# Patient Record
Sex: FEMALE | Race: WHITE | NOT HISPANIC OR LATINO
[De-identification: names, ages, dates, MRNs, and addresses within clinical notes are randomized per-mention and may not be internally consistent; named-entity substitution may affect disease eponyms.]

---

## 2017-04-18 ENCOUNTER — NON-APPOINTMENT (OUTPATIENT)
Age: 77
End: 2017-04-18

## 2017-04-18 ENCOUNTER — APPOINTMENT (OUTPATIENT)
Dept: CARDIOLOGY | Facility: CLINIC | Age: 77
End: 2017-04-18

## 2017-04-18 VITALS
DIASTOLIC BLOOD PRESSURE: 74 MMHG | SYSTOLIC BLOOD PRESSURE: 173 MMHG | HEIGHT: 62 IN | BODY MASS INDEX: 24.11 KG/M2 | HEART RATE: 60 BPM | OXYGEN SATURATION: 99 % | WEIGHT: 131 LBS

## 2017-04-18 VITALS — DIASTOLIC BLOOD PRESSURE: 60 MMHG | SYSTOLIC BLOOD PRESSURE: 122 MMHG

## 2017-04-24 LAB
ALBUMIN SERPL ELPH-MCNC: 4.3 G/DL
ALP BLD-CCNC: 87 U/L
ALT SERPL-CCNC: 18 U/L
ANION GAP SERPL CALC-SCNC: 14 MMOL/L
AST SERPL-CCNC: 18 U/L
BILIRUB SERPL-MCNC: 0.7 MG/DL
BUN SERPL-MCNC: 18 MG/DL
CALCIUM SERPL-MCNC: 10 MG/DL
CHLORIDE SERPL-SCNC: 102 MMOL/L
CHOLEST SERPL-MCNC: 168 MG/DL
CHOLEST/HDLC SERPL: 2 RATIO
CO2 SERPL-SCNC: 26 MMOL/L
CREAT SERPL-MCNC: 0.89 MG/DL
GLUCOSE SERPL-MCNC: 108 MG/DL
HDLC SERPL-MCNC: 84 MG/DL
LDLC SERPL CALC-MCNC: 74 MG/DL
POTASSIUM SERPL-SCNC: 4.2 MMOL/L
PROT SERPL-MCNC: 6.9 G/DL
SODIUM SERPL-SCNC: 142 MMOL/L
TRIGL SERPL-MCNC: 51 MG/DL

## 2017-04-27 ENCOUNTER — APPOINTMENT (OUTPATIENT)
Dept: CARDIOLOGY | Facility: CLINIC | Age: 77
End: 2017-04-27

## 2017-05-08 ENCOUNTER — APPOINTMENT (OUTPATIENT)
Dept: CARDIOLOGY | Facility: CLINIC | Age: 77
End: 2017-05-08

## 2017-05-24 ENCOUNTER — OUTPATIENT (OUTPATIENT)
Dept: OUTPATIENT SERVICES | Facility: HOSPITAL | Age: 77
LOS: 1 days | End: 2017-05-24
Payer: MEDICARE

## 2017-05-24 DIAGNOSIS — I26.99 OTHER PULMONARY EMBOLISM WITHOUT ACUTE COR PULMONALE: ICD-10-CM

## 2017-05-24 PROCEDURE — A9540: CPT

## 2017-05-24 PROCEDURE — 78582 LUNG VENTILAT&PERFUS IMAGING: CPT | Mod: 26

## 2017-05-24 PROCEDURE — 71020: CPT | Mod: 26

## 2017-05-24 PROCEDURE — 71046 X-RAY EXAM CHEST 2 VIEWS: CPT

## 2017-05-24 PROCEDURE — A9567: CPT

## 2017-05-24 PROCEDURE — 78582 LUNG VENTILAT&PERFUS IMAGING: CPT

## 2017-06-28 ENCOUNTER — APPOINTMENT (OUTPATIENT)
Dept: OBGYN | Facility: CLINIC | Age: 77
End: 2017-06-28

## 2017-06-28 VITALS
WEIGHT: 129 LBS | HEIGHT: 63 IN | SYSTOLIC BLOOD PRESSURE: 184 MMHG | DIASTOLIC BLOOD PRESSURE: 80 MMHG | BODY MASS INDEX: 22.86 KG/M2

## 2017-07-03 LAB — CYTOLOGY CVX/VAG DOC THIN PREP: NORMAL

## 2017-07-18 ENCOUNTER — LABORATORY RESULT (OUTPATIENT)
Age: 77
End: 2017-07-18

## 2017-07-18 ENCOUNTER — APPOINTMENT (OUTPATIENT)
Dept: PULMONOLOGY | Facility: CLINIC | Age: 77
End: 2017-07-18

## 2017-07-18 VITALS
RESPIRATION RATE: 14 BRPM | BODY MASS INDEX: 23.57 KG/M2 | DIASTOLIC BLOOD PRESSURE: 72 MMHG | HEART RATE: 66 BPM | WEIGHT: 133 LBS | HEIGHT: 63 IN | TEMPERATURE: 97.7 F | OXYGEN SATURATION: 96 % | SYSTOLIC BLOOD PRESSURE: 128 MMHG

## 2017-07-18 DIAGNOSIS — J18.9 PNEUMONIA, UNSPECIFIED ORGANISM: ICD-10-CM

## 2017-07-18 DIAGNOSIS — C44.90 UNSPECIFIED MALIGNANT NEOPLASM OF SKIN, UNSPECIFIED: ICD-10-CM

## 2017-07-18 DIAGNOSIS — N60.01 SOLITARY CYST OF RIGHT BREAST: ICD-10-CM

## 2017-07-18 DIAGNOSIS — Z80.9 FAMILY HISTORY OF MALIGNANT NEOPLASM, UNSPECIFIED: ICD-10-CM

## 2017-07-18 DIAGNOSIS — Z82.3 FAMILY HISTORY OF STROKE: ICD-10-CM

## 2017-07-19 LAB
25(OH)D3 SERPL-MCNC: 38 NG/ML
A1AT SERPL-MCNC: 174 MG/DL
ALBUMIN SERPL ELPH-MCNC: 4.5 G/DL
ALP BLD-CCNC: 87 U/L
ALT SERPL-CCNC: 32 U/L
ANION GAP SERPL CALC-SCNC: 21 MMOL/L
APTT BLD: 32.4 SEC
AST SERPL-CCNC: 36 U/L
B2 MICROGLOB SERPL-MCNC: 2 MG/L
BASOPHILS # BLD AUTO: 0.04 K/UL
BASOPHILS NFR BLD AUTO: 0.4 %
BILIRUB SERPL-MCNC: 1 MG/DL
BUN SERPL-MCNC: 18 MG/DL
CALCIUM SERPL-MCNC: 10.3 MG/DL
CALCIUM SERPL-MCNC: 10.3 MG/DL
CCP AB SER IA-ACNC: <8 UNITS
CHLORIDE SERPL-SCNC: 99 MMOL/L
CO2 SERPL-SCNC: 22 MMOL/L
CREAT SERPL-MCNC: 0.85 MG/DL
CRP SERPL-MCNC: 0.3 MG/DL
DEPRECATED KAPPA LC FREE/LAMBDA SER: 1.01 RATIO
ENA SCL70 IGG SER IA-ACNC: <0.2 AL
ENA SS-A AB SER IA-ACNC: <0.2 AL
ENA SS-B AB SER IA-ACNC: <0.2 AL
EOSINOPHIL # BLD AUTO: 0.61 K/UL
EOSINOPHIL NFR BLD AUTO: 5.8 %
ERYTHROCYTE [SEDIMENTATION RATE] IN BLOOD BY WESTERGREN METHOD: 17 MM/HR
FOLATE RBC-MCNC: 1934 NG/ML
GLUCOSE SERPL-MCNC: 87 MG/DL
HBA1C MFR BLD HPLC: 5.6 %
HCT VFR BLD CALC: 42 %
HCT VFR BLD CALC: 43 %
HCYS SERPL-MCNC: 10.6 UMOL/L
HGB BLD-MCNC: 13.3 G/DL
IGA SER QL IEP: 140 MG/DL
IGG SER QL IEP: 1070 MG/DL
IGM SER QL IEP: 35 MG/DL
IMM GRANULOCYTES NFR BLD AUTO: 0.1 %
INR PPP: 0.92 RATIO
KAPPA LC CSF-MCNC: 1.66 MG/DL
KAPPA LC SERPL-MCNC: 1.67 MG/DL
LYMPHOCYTES # BLD AUTO: 2.69 K/UL
LYMPHOCYTES NFR BLD AUTO: 25.6 %
MAN DIFF?: NORMAL
MCHC RBC-ENTMCNC: 28.4 PG
MCHC RBC-ENTMCNC: 30.9 GM/DL
MCV RBC AUTO: 91.9 FL
MONOCYTES # BLD AUTO: 0.66 K/UL
MONOCYTES NFR BLD AUTO: 6.3 %
MPO AB + PR3 PNL SER: NORMAL
NEUTROPHILS # BLD AUTO: 6.49 K/UL
NEUTROPHILS NFR BLD AUTO: 61.8 %
NT-PROBNP SERPL-MCNC: 321 PG/ML
PARATHYROID HORMONE INTACT: 38 PG/ML
PHOSPHATE SERPL-MCNC: 3.1 MG/DL
PLATELET # BLD AUTO: 270 K/UL
POTASSIUM SERPL-SCNC: 3.9 MMOL/L
PROT SERPL-MCNC: 7.8 G/DL
PT BLD: 10.4 SEC
RBC # BLD: 4.68 M/UL
RBC # FLD: 13.6 %
RF+CCP IGG SER-IMP: NEGATIVE
RHEUMATOID FACT SER QL: <7 IU/ML
SODIUM SERPL-SCNC: 142 MMOL/L
T3 SERPL-MCNC: 99 NG/DL
T3FREE SERPL-MCNC: 2.87 PG/ML
T3RU NFR SERPL: 1.03 INDEX
T4 FREE SERPL-MCNC: 1.2 NG/DL
TSH SERPL-ACNC: 1.75 UIU/ML
URATE SERPL-MCNC: 2.7 MG/DL
VIT B12 SERPL-MCNC: 875 PG/ML
WBC # FLD AUTO: 10.5 K/UL

## 2017-07-20 LAB
ANACR T: NEGATIVE
CARDIOLIPIN AB SER IA-ACNC: NEGATIVE
TOTAL IGE SMQN RAST: 18 KU/L

## 2017-07-21 LAB — COLLAGEN CTX SERPL-MCNC: 197 PG/ML

## 2017-07-24 LAB — T4 FREE SERPL DIALY-MCNC: 0.94 NG/DL

## 2017-08-01 ENCOUNTER — OUTPATIENT (OUTPATIENT)
Dept: OUTPATIENT SERVICES | Facility: HOSPITAL | Age: 77
LOS: 1 days | End: 2017-08-01
Payer: MEDICARE

## 2017-08-01 VITALS
HEART RATE: 56 BPM | DIASTOLIC BLOOD PRESSURE: 76 MMHG | RESPIRATION RATE: 16 BRPM | WEIGHT: 130.07 LBS | HEIGHT: 66 IN | TEMPERATURE: 98 F | OXYGEN SATURATION: 100 % | SYSTOLIC BLOOD PRESSURE: 196 MMHG

## 2017-08-01 DIAGNOSIS — I27.2 OTHER SECONDARY PULMONARY HYPERTENSION: ICD-10-CM

## 2017-08-01 DIAGNOSIS — Z90.49 ACQUIRED ABSENCE OF OTHER SPECIFIED PARTS OF DIGESTIVE TRACT: Chronic | ICD-10-CM

## 2017-08-01 DIAGNOSIS — Z90.89 ACQUIRED ABSENCE OF OTHER ORGANS: Chronic | ICD-10-CM

## 2017-08-01 DIAGNOSIS — Z98.890 OTHER SPECIFIED POSTPROCEDURAL STATES: Chronic | ICD-10-CM

## 2017-08-01 LAB
ALBUMIN SERPL ELPH-MCNC: 4.2 G/DL — SIGNIFICANT CHANGE UP (ref 3.3–5)
ALP SERPL-CCNC: 86 U/L — SIGNIFICANT CHANGE UP (ref 40–120)
ALT FLD-CCNC: 28 U/L RC — SIGNIFICANT CHANGE UP (ref 10–45)
ANION GAP SERPL CALC-SCNC: 13 MMOL/L — SIGNIFICANT CHANGE UP (ref 5–17)
AST SERPL-CCNC: 27 U/L — SIGNIFICANT CHANGE UP (ref 10–40)
BILIRUB SERPL-MCNC: 1 MG/DL — SIGNIFICANT CHANGE UP (ref 0.2–1.2)
BUN SERPL-MCNC: 19 MG/DL — SIGNIFICANT CHANGE UP (ref 7–23)
CALCIUM SERPL-MCNC: 9.9 MG/DL — SIGNIFICANT CHANGE UP (ref 8.4–10.5)
CHLORIDE SERPL-SCNC: 103 MMOL/L — SIGNIFICANT CHANGE UP (ref 96–108)
CO2 SERPL-SCNC: 25 MMOL/L — SIGNIFICANT CHANGE UP (ref 22–31)
CREAT SERPL-MCNC: 0.82 MG/DL — SIGNIFICANT CHANGE UP (ref 0.5–1.3)
GLUCOSE SERPL-MCNC: 98 MG/DL — SIGNIFICANT CHANGE UP (ref 70–99)
HCT VFR BLD CALC: 42.1 % — SIGNIFICANT CHANGE UP (ref 34.5–45)
HGB BLD-MCNC: 13.8 G/DL — SIGNIFICANT CHANGE UP (ref 11.5–15.5)
MCHC RBC-ENTMCNC: 30.9 PG — SIGNIFICANT CHANGE UP (ref 27–34)
MCHC RBC-ENTMCNC: 32.7 GM/DL — SIGNIFICANT CHANGE UP (ref 32–36)
MCV RBC AUTO: 94.3 FL — SIGNIFICANT CHANGE UP (ref 80–100)
PLATELET # BLD AUTO: 223 K/UL — SIGNIFICANT CHANGE UP (ref 150–400)
POTASSIUM SERPL-MCNC: 4.9 MMOL/L — SIGNIFICANT CHANGE UP (ref 3.5–5.3)
POTASSIUM SERPL-SCNC: 4.9 MMOL/L — SIGNIFICANT CHANGE UP (ref 3.5–5.3)
PROT SERPL-MCNC: 7.2 G/DL — SIGNIFICANT CHANGE UP (ref 6–8.3)
RBC # BLD: 4.47 M/UL — SIGNIFICANT CHANGE UP (ref 3.8–5.2)
RBC # FLD: 11.3 % — SIGNIFICANT CHANGE UP (ref 10.3–14.5)
SODIUM SERPL-SCNC: 141 MMOL/L — SIGNIFICANT CHANGE UP (ref 135–145)
WBC # BLD: 10.5 K/UL — SIGNIFICANT CHANGE UP (ref 3.8–10.5)
WBC # FLD AUTO: 10.5 K/UL — SIGNIFICANT CHANGE UP (ref 3.8–10.5)

## 2017-08-01 PROCEDURE — 80053 COMPREHEN METABOLIC PANEL: CPT

## 2017-08-01 PROCEDURE — 93005 ELECTROCARDIOGRAM TRACING: CPT

## 2017-08-01 PROCEDURE — C1894: CPT

## 2017-08-01 PROCEDURE — 93451 RIGHT HEART CATH: CPT

## 2017-08-01 PROCEDURE — 93010 ELECTROCARDIOGRAM REPORT: CPT

## 2017-08-01 PROCEDURE — 93451 RIGHT HEART CATH: CPT | Mod: 26,GC

## 2017-08-01 PROCEDURE — 99152 MOD SED SAME PHYS/QHP 5/>YRS: CPT

## 2017-08-01 PROCEDURE — 85027 COMPLETE CBC AUTOMATED: CPT

## 2017-08-01 PROCEDURE — C1769: CPT

## 2017-08-01 NOTE — H&P CARDIOLOGY - PMH
Aortic valve regurgitation    Carotid stenosis    HLD (hyperlipidemia)    HTN (hypertension)    Mitral regurgitation  Mild  Seizure disorder Aortic valve regurgitation  mild  Carotid stenosis    HLD (hyperlipidemia)    HTN (hypertension)    Mitral regurgitation  Mild  Seizure disorder  least episode 1.5 years ago

## 2017-08-01 NOTE — H&P CARDIOLOGY - HISTORY OF PRESENT ILLNESS
78 y/o Female with PMHx of HTN, HLD, Seizure disorder presents for coronary angiogram. Patient states she gets SOB with exertion, especially with one flight of steps. Denies any chest pain, palpitation dizziness/ syncope. Seen & evaluated by cardiologist, Dr. Hernandez and recommends for cardiac cath to evaluate for pul. HTN.   Echo on 5/2017: PASP 63 mmhg, mild MR, moderate to severe TR  PFT 7/18/17: Restrictive disease, decreased DLCO  VQ scan: Low probability of PE (5/2017)

## 2017-08-17 ENCOUNTER — APPOINTMENT (OUTPATIENT)
Dept: CARDIOLOGY | Facility: CLINIC | Age: 77
End: 2017-08-17

## 2017-09-26 ENCOUNTER — APPOINTMENT (OUTPATIENT)
Dept: PULMONOLOGY | Facility: CLINIC | Age: 77
End: 2017-09-26

## 2017-11-09 ENCOUNTER — APPOINTMENT (OUTPATIENT)
Dept: CARDIOLOGY | Facility: CLINIC | Age: 77
End: 2017-11-09
Payer: MEDICARE

## 2017-11-09 ENCOUNTER — NON-APPOINTMENT (OUTPATIENT)
Age: 77
End: 2017-11-09

## 2017-11-09 VITALS
DIASTOLIC BLOOD PRESSURE: 73 MMHG | HEIGHT: 63 IN | BODY MASS INDEX: 23.04 KG/M2 | HEART RATE: 52 BPM | SYSTOLIC BLOOD PRESSURE: 147 MMHG | WEIGHT: 130 LBS | OXYGEN SATURATION: 97 %

## 2017-11-09 VITALS — DIASTOLIC BLOOD PRESSURE: 60 MMHG | SYSTOLIC BLOOD PRESSURE: 130 MMHG

## 2017-11-09 DIAGNOSIS — Z01.419 ENCOUNTER FOR GYNECOLOGICAL EXAMINATION (GENERAL) (ROUTINE) W/OUT ABNORMAL FINDINGS: ICD-10-CM

## 2017-11-09 PROCEDURE — 99215 OFFICE O/P EST HI 40 MIN: CPT

## 2017-11-09 PROCEDURE — 93000 ELECTROCARDIOGRAM COMPLETE: CPT

## 2017-12-06 ENCOUNTER — NON-APPOINTMENT (OUTPATIENT)
Age: 77
End: 2017-12-06

## 2017-12-06 ENCOUNTER — APPOINTMENT (OUTPATIENT)
Dept: CARDIOLOGY | Facility: CLINIC | Age: 77
End: 2017-12-06
Payer: MEDICARE

## 2017-12-06 VITALS
DIASTOLIC BLOOD PRESSURE: 64 MMHG | BODY MASS INDEX: 23.39 KG/M2 | HEIGHT: 63 IN | HEART RATE: 52 BPM | SYSTOLIC BLOOD PRESSURE: 116 MMHG | OXYGEN SATURATION: 99 % | WEIGHT: 132 LBS

## 2017-12-06 PROCEDURE — 99215 OFFICE O/P EST HI 40 MIN: CPT

## 2017-12-06 PROCEDURE — 93000 ELECTROCARDIOGRAM COMPLETE: CPT

## 2017-12-12 ENCOUNTER — APPOINTMENT (OUTPATIENT)
Dept: CARDIOLOGY | Facility: CLINIC | Age: 77
End: 2017-12-12
Payer: MEDICARE

## 2017-12-12 PROCEDURE — 93015 CV STRESS TEST SUPVJ I&R: CPT

## 2017-12-13 ENCOUNTER — APPOINTMENT (OUTPATIENT)
Dept: CARDIOLOGY | Facility: CLINIC | Age: 77
End: 2017-12-13
Payer: MEDICARE

## 2017-12-13 PROCEDURE — 93224 XTRNL ECG REC UP TO 48 HRS: CPT

## 2018-01-08 ENCOUNTER — MEDICATION RENEWAL (OUTPATIENT)
Age: 78
End: 2018-01-08

## 2018-02-28 ENCOUNTER — RX RENEWAL (OUTPATIENT)
Age: 78
End: 2018-02-28

## 2018-05-22 ENCOUNTER — APPOINTMENT (OUTPATIENT)
Dept: CARDIOLOGY | Facility: CLINIC | Age: 78
End: 2018-05-22
Payer: MEDICARE

## 2018-05-22 ENCOUNTER — OTHER (OUTPATIENT)
Age: 78
End: 2018-05-22

## 2018-05-22 ENCOUNTER — NON-APPOINTMENT (OUTPATIENT)
Age: 78
End: 2018-05-22

## 2018-05-22 VITALS
OXYGEN SATURATION: 100 % | DIASTOLIC BLOOD PRESSURE: 73 MMHG | SYSTOLIC BLOOD PRESSURE: 160 MMHG | HEART RATE: 55 BPM | WEIGHT: 135 LBS | HEIGHT: 63 IN | BODY MASS INDEX: 23.92 KG/M2

## 2018-05-22 VITALS — DIASTOLIC BLOOD PRESSURE: 60 MMHG | SYSTOLIC BLOOD PRESSURE: 130 MMHG

## 2018-05-22 PROCEDURE — 93000 ELECTROCARDIOGRAM COMPLETE: CPT

## 2018-05-22 PROCEDURE — 99215 OFFICE O/P EST HI 40 MIN: CPT

## 2018-05-22 RX ORDER — LAMOTRIGINE 25 MG/1
25 TABLET ORAL TWICE DAILY
Refills: 0 | Status: ACTIVE | COMMUNITY

## 2018-06-07 ENCOUNTER — APPOINTMENT (OUTPATIENT)
Dept: CARDIOLOGY | Facility: CLINIC | Age: 78
End: 2018-06-07
Payer: MEDICARE

## 2018-06-07 PROCEDURE — 93306 TTE W/DOPPLER COMPLETE: CPT

## 2018-06-12 ENCOUNTER — APPOINTMENT (OUTPATIENT)
Dept: CARDIOLOGY | Facility: CLINIC | Age: 78
End: 2018-06-12
Payer: MEDICARE

## 2018-06-12 PROCEDURE — 93880 EXTRACRANIAL BILAT STUDY: CPT

## 2018-07-16 PROBLEM — G40.909 EPILEPSY, UNSPECIFIED, NOT INTRACTABLE, WITHOUT STATUS EPILEPTICUS: Chronic | Status: ACTIVE | Noted: 2017-08-01

## 2018-09-06 PROBLEM — E78.5 HYPERLIPIDEMIA, UNSPECIFIED: Chronic | Status: ACTIVE | Noted: 2017-08-01

## 2018-09-06 PROBLEM — I10 ESSENTIAL (PRIMARY) HYPERTENSION: Chronic | Status: ACTIVE | Noted: 2017-08-01

## 2018-10-15 ENCOUNTER — APPOINTMENT (OUTPATIENT)
Dept: PULMONOLOGY | Facility: CLINIC | Age: 78
End: 2018-10-15
Payer: MEDICARE

## 2018-10-15 VITALS
HEART RATE: 68 BPM | RESPIRATION RATE: 14 BRPM | BODY MASS INDEX: 22.5 KG/M2 | OXYGEN SATURATION: 99 % | HEIGHT: 63 IN | DIASTOLIC BLOOD PRESSURE: 72 MMHG | WEIGHT: 127 LBS | SYSTOLIC BLOOD PRESSURE: 135 MMHG

## 2018-10-15 DIAGNOSIS — G47.30 SLEEP APNEA, UNSPECIFIED: ICD-10-CM

## 2018-10-15 PROCEDURE — 99214 OFFICE O/P EST MOD 30 MIN: CPT | Mod: 25

## 2018-10-15 PROCEDURE — 94060 EVALUATION OF WHEEZING: CPT

## 2018-10-15 PROCEDURE — 94729 DIFFUSING CAPACITY: CPT

## 2018-10-15 PROCEDURE — 71046 X-RAY EXAM CHEST 2 VIEWS: CPT

## 2018-10-15 PROCEDURE — 94727 GAS DIL/WSHOT DETER LNG VOL: CPT

## 2018-11-12 ENCOUNTER — NON-APPOINTMENT (OUTPATIENT)
Age: 78
End: 2018-11-12

## 2018-11-12 ENCOUNTER — APPOINTMENT (OUTPATIENT)
Dept: CARDIOLOGY | Facility: CLINIC | Age: 78
End: 2018-11-12
Payer: MEDICARE

## 2018-11-12 VITALS
OXYGEN SATURATION: 99 % | DIASTOLIC BLOOD PRESSURE: 74 MMHG | BODY MASS INDEX: 23.04 KG/M2 | WEIGHT: 130 LBS | SYSTOLIC BLOOD PRESSURE: 158 MMHG | HEART RATE: 50 BPM | HEIGHT: 63 IN

## 2018-11-12 VITALS — SYSTOLIC BLOOD PRESSURE: 130 MMHG | DIASTOLIC BLOOD PRESSURE: 60 MMHG

## 2018-11-12 PROCEDURE — 99215 OFFICE O/P EST HI 40 MIN: CPT

## 2018-11-12 PROCEDURE — 93000 ELECTROCARDIOGRAM COMPLETE: CPT

## 2018-11-12 NOTE — REASON FOR VISIT
[Atrial Fibrillation] : atrial fibrillation [Hyperlipidemia] : hyperlipidemia [Hypertension] : hypertension [FreeTextEntry1] : aortic regurgitation

## 2018-11-12 NOTE — HISTORY OF PRESENT ILLNESS
[FreeTextEntry1] : Mrs. Kaylyn Schultz presented to the office today for cardiac evaluation.\par \par The patient is a 78-year-old female with a history of paroxysmal atrial  fibrillation, hypertension, a dyslipidemia, mild to moderate aortic regurgitation, mild to moderate carotid atherosclerosis, supraventricular ectopy (including non-sustained supraventricular tachycardia exhibited on Holter monitoring), asthma, mild obstructive sleep apnea, moderate pulmonary hypertension, osteoporosis, and transient amnesia, suspected as being related to seizure activity.\par \par The patient has been doing well from a cardiac symptomatic standpoint since her previous visit here on 5/22/18. Specifically, she has not experienced chest discomfort or dyspnea on exertion in association with her activities, which include walking 1 mile each morning. She has not noted orthopnea, paroxysmal nocturnal dyspnea, or lower extremity edema. She has not experienced any episodes of palpitations or recognized recurrence of paroxysmal atrial fibrillation. 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 She experienced an episode of gross hematuria in October of 2018, for which she followed up with a urologist, including having had a CT scan of the abdomen/pelvis performed, which she describes as having been unrevealing.\par \par The patient followed up with her pulmonologist, Dr. Carter, on 10/15/18, regarding asthma, obstructive sleep apnea, and pulmonary hypertension.\par \par Exercise stress testing performed on 12/12/17 revealed the patient to exhibit reduced exercise tolerance, but not the inducement of cardiac symptoms, electrocardiogram evidence of myocardial ischemia, or significant arrhythmias at 65% of the age-predicted maximum heart rate.\par \par Echocardiography most recently performed on 6/7/18 revealed moderate left atrial enlargement and mild right atrial enlargement. The ventricles were normal in dimension. Left ventricular wall thickness and wall motion were normal, with a calculated ejection fraction of 61%. Moderate left ventricular diastolic dysfunction is demonstrated. Mild mitral regurgitation, mild aortic regurgitation, and moderate to severe tricuspid regurgitation were demonstrated, with an estimated right ventricular systolic pressure of 52 mmHg.\par \par Carotid artery Doppler testing most recently performed on 6/12/18 revealed mild plaque formation involving the common carotid arteries and the bulbar regions bilaterally. Mild to moderate plaque formation was noted involving the proximal portion of the left internal carotid artery. Moderate to severe plaque formation was noted involving the proximal portion of the right internal carotid artery, with an estimated stenosis in the range of 50-79% involving this region.\par \par Holter monitoring performed from 12/13/17 through 12/14/17 revealed the predominant rhythm throughout the recording to be sinus rhythm at an average rate of 61 beats per minute, with a range of 46-81 beats per minute. Occasional atrial premature contractions were exhibited, including 10 couplets and 15 brief runs of an irregular supraventricular tachycardia, likely representing brief paroxysmal atrial fibrillation (longest episode 6 beats in duration at a rate of 140 beats per minute). Rare ventricular premature contractions were exhibited. No episodes of ventricular tachycardia were exhibited. No significant pauses were exhibited. The patient reported having experienced shortness of breath while watching TV on one occasion, at which time sinus rhythm at a rate of 60 beats per minute was exhibited.\par \par Laboratory studies performed on 5/30/ET revealed cholesterol 162, triglycerides 47, HDL 77, and calculated LDL 72. In the liver chemistries were normal. The glucose level was 87. The BUN and creatinine were 21 and 0.76, respectively. The potassium level was 4.7. The hemoglobin and hematocrit were 13.1 and 40.5, respectively.\par \par Previous History:\par \par The patient presented to her internist's office for a routine follow-up medical evaluation on 12/4/17, at which time she was evaluated by Dr. Chary aDng. The patient was noted to be exhibiting newly-discovered atrial fibrillation with an average ventricular rate of 120 beats per minute on her electrocardiogram. The patient was asymptomatic of the atrial fibrillation, and hence, the duration of this arrhythmia was uncertain. Dr. Dang telephoned me while the patient was in her office that day, to inform me that the patient was exhibiting newly-discovered atrial fibrillation with a moderately tachycardic ventricular response. We discussed initiating beta-blocker therapy (Toprol-XL 25 mg daily) for control of the ventricular response, as well as anticoagulation (Eliquis 5 mg b.i.d.) to decrease the risk of stroke associated with atrial fibrillation. The patient was exhibiting sinus bradycardia at the time of a follow-up visit with me on 12/6/17.\par \par I had referred the patient for follow-up echocardiography to reassess the degree of aortic regurgitation, with this study having been performed on 5/8/17. Moderate to severe tricuspid regurgitation was demonstrated, with an estimated right ventricular systolic pressure of 63 mm mercury. I then referred the patient for coronary consultation regarding the elevated pulmonary artery systolic pressure. Her evaluation included a pulmonary ventilation perfusion scan, we'll which was performed on 5/24/17, and was interpreted as being low probability for pulmonary embolus. The patient underwent a sleep study, which was suggestive of mild obstructive sleep apnea. The patient was evaluated by a pulmonary hypertension specialist, was referred for right heart catheterization, which was performed on 8/1/17. Interestingly, this study revealed a normal pulmonary artery pressure and a normal pulmonary capillary wedge pressure.\par \par Echocardiography performed on 5/8/17 revealed mild bi-atrial enlargement. The ventricles were normal in dimension. Left ventricular wall thickness and wall motion were normal, with a calculated ejection fraction of 65%. Mild aortic regurgitation, mild mitral regurgitation, and moderate to severe tricuspid regurgitation were demonstrated, with an estimated right ventricular systolic pressure of 63 mm mercury.\par \[facility] Carotid artery Doppler testing performed on 4/27/17 revealed moderate to severe plaque formation involving the internal carotid arteries bilaterally, with an estimated stenosis in the range of 50-79% involving the proximal portions of the internal carotid arteries bilaterally.\par \elda I initially evaluated the patient on 11/25/15, regarding transient episodes of amnesia, which were suspicious for seizures. Following that visit here, the patient experienced 2 recurrent transient episodes of amnesia. She had a second neurological opinion with Dr. 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 The patient was subsequently started on Lamotrigine.\par \par Exercise stress testing performed on  12/15/15 revealed the patient to exhibit reduced exercise tolerance, without the inducement of cardiac symptoms. The study was negative for electrocardiographic evidence of myocardial ischemia. Occasional supraventricular premature contractions were noted during exercise and recovery period.\par \par Echocardiography performed on 12/9/15 revealed the left atrium  to be mildly enlarged. The remainder of the cardiac chambers were normal size. Left ventricular wall thickness and wall motion were normal, with a calculated ejection fraction of 60%. There was no evidence of left ventricular diastolic dysfunction. Mild to moderate aortic insufficiency, mitral regurgitation, and moderate tricuspid regurgitation were demonstrated, with an estimated right ventricular systolic pressure of 46 mm mercury.\par \par Carotid artery Doppler testing performed on 12/8/15 revealed mild to moderate plaque formation bilaterally involving the bulbar regions, extending into the proximal portions of the internal carotid arteries bilaterally. No significant stenoses were demonstrated.\par \par Holter monitoring performed from 12/1/15 through 12/2/15 revealed the predominant rhythm to be sinus at an average rate of 64 beats per minute, with a range of  beats per minute. Relatively frequent supraventricular premature contractions were exhibited. Multiple brief episodes of supraventricular tachycardia were exhibited, as well as a 52 beat run of a supraventricular tachycardia at a rate of 101 beats per minute. Rare ventricular premature contractions were exhibited. No episodes of ventricular tachycardia were exhibited. No significant pauses or conduction abnormalities were exhibited.\par \par At the time of her initial visit here on 11/25/15, the patient informed me that over the preceding few months, she had experienced 4 randomly-occurring episodes of transient memory impairment. She was evaluated by a neurologist, who referred her for an MRI scan of the brain, an MRA scan of the carotid arteries and cerebral circulation, and an EEG. The MRI scan of the brain, performed on 9/28/15, reportedly revealed mild small vessel ischemic disease. The MRA of the carotid arteries, performed on 9/28/15, reportedly revealed a 50-60% stenosis involving the proximal portion of the right internal carotid artery. The EEG, performed on 10/8/15, was reportedly normal.\par \par Over the past few years, the patient has experienced infrequent randomly-occurring brief palpitations, described as a sensation of "a fast heartbeat" which last for just a few seconds, without any associated symptoms. She has not experienced any sustained episodes of palpitations. She has not experienced any episodes of presyncope or syncope.\par \par As far as risk factors for coronary artery disease are concerned, the patient has a history of hypertension and a dyslipidemia. She does have a history of diabetes. She denies a history of cigarette smoking. She does have an immediate family history of premature coronary artery disease in her son, who is also under my care.\par \par Past medical/surgical history according to the patient is significant for a laparoscopic cholecystectomy, right inguinal hernia repair, tonsillectomy, and 3 full-term uncomplicated pregnancies, delivered vaginally.

## 2018-11-12 NOTE — DISCUSSION/SUMMARY
[FreeTextEntry1] : The patient was noted to be exhibiting newly-discovered asymptomatic atrial fibrillation with a moderately tachycardic ventricular response (120 beats per minute) on an electrocardiogram performed at the time of a routine follow-up medical evaluation on 12/4/17. Beta-blocker therapy (Toprol-XL 25 mg daily) was initiated at that time for control of the ventricular response, and anticoagulation (Eliquis 5 mg b.i.d.) was initiated at that time to decrease the risk of stroke associated with atrial fibrillation. She was noted to be exhibiting sinus bradycardia at the time for a follow-up visit with me on 12/6/17, and also was noted to be exhibiting predominantly sinus rhythm on a Holter monitor study performed on 12/13/17 (infrequent transient episodes of paroxysmal atrial fibrillation were exhibited, the longest of which was 6 beats in duration at a rate of 140 beats per minute). The patient has been stable from a cardiac symptomatic standpoint since her previous visit here on 5/22/18. Specifically, she does not describe having experienced any signs or symptoms to suggest the development of an anginal syndrome, congestive heart failure, or a hemodynamically-compromising arrhythmia. She is exhibiting sinus bradycardia with non-specific low voltage associated with poor R-wave progression in leads V5-V6 and non-specific repolarization changes on her electrocardiogram today, essentially unchanged from her previous office tracing, allowing for lead placement variation. Her cardiac examination today is remarkable for a bradycardic rate and a faint systolic ejection murmur, essentially unchanged from her previous visit with me. Her blood pressure reading was elevated upon initial presentation to the office today, however, a follow-up measurement obtained after her examination revealed the reading to be normal.\par \par I have reviewed the findings of the Holter monitor study of 12/13/17, the exercise stress test on 12/12/17, the echocardiogram of 6/7/18, and the carotid artery Doppler study of 612/18 in detail with the patient today.\par \par I have explained the implications of paroxysmal atrial fibrillation in detail to the patient and her son today, including management strategies.  Although the options of antiarrhythmic therapy and/or an ablation procedure were discussed, as the patient was asymptomatic of the paroxysmal atrial fibrillation when she was exhibiting this arrhythmia on 12/4/17, I have not recommended that either of these options be pursued, unless the ventricular response cannot be adequately controlled with medical therapy. I have recommended to the patient that she continue Toprol-XL at a dosage of 25 mg daily for the time being.  In addition, I have recommended that anticoagulation be continued to reduce the risk of stroke associated with paroxysmal atrial fibrillation, noting that it is highly likely that the patient will exhibit recurrence of this arrhythmia.\par \par As her blood pressure reading today is normal, I have instructed the patient to continue her antihypertensive therapy as presently prescribed for the time being.\par \par I have reviewed the findings of the blood test report a 5/30/18 in detail with the patient today, and I have instructed her to continue Lipitor at a dosage of 20 mg daily for the time being. I have asked the patient to have a copy of her most recent fasting lipid profile and chemistry screen results forwarded to my office for my review and records.\par \par I have again explained to the patient that although her echocardiogram suggested the presence of significant pulmonary hypertension, the right heart catheterization of 8/1/17 revealed a normal pulmonary artery systolic pressure and a normal pulmonary capillary wedge pressure.\par \par The patient will be following up with the neurologist regarding management of her seizure disorder.\par \par I have asked the patient to call me if she should have any questions or problems pertaining to these matters, and especially if she should experience any concerning symptoms. I have otherwise asked her to return to the office in 6 months, provided she remains clinically stable in the interim.  Arrangements will be made for follow-up echocardiography and carotid artery Doppler testing to be performed in June of 2019.

## 2018-11-12 NOTE — PHYSICAL EXAM
[General Appearance - Well Developed] : well developed [General Appearance - In No Acute Distress] : no acute distress [Normal Conjunctiva] : the conjunctiva exhibited no abnormalities [No Oral Pallor] : no oral pallor [Respiration, Rhythm And Depth] : normal respiratory rhythm and effort [Auscultation Breath Sounds / Voice Sounds] : lungs were clear to auscultation bilaterally [Bowel Sounds] : normal bowel sounds [Abdomen Tenderness] : non-tender [Abnormal Walk] : normal gait [Cyanosis, Localized] : no localized cyanosis [] : no rash [Oriented To Time, Place, And Person] : oriented to person, place, and time [Not Palpable] : not palpable [No Precordial Heave] : no precordial heave was noted [Bradycardia] : bradycardic [Premature Beats] : regular with premature beats [Normal S1] : normal S1 [Normal S2] : normal S2 [No Gallop] : no gallop heard [I] : a grade 1 [2+] : left 2+ [No Abnormalities] : the abdominal aorta was not enlarged and no bruit was heard [No Pitting Edema] : no pitting edema present [FreeTextEntry1] : no JVD is appreciated at a 45° angle [Apical Thrill] : no thrill palpable at the apex [Click] : no click [Pericardial Rub] : no pericardial rub [Right Carotid Bruit] : no bruit heard over the right carotid [Left Carotid Bruit] : no bruit heard over the left carotid

## 2019-04-05 ENCOUNTER — RX RENEWAL (OUTPATIENT)
Age: 79
End: 2019-04-05

## 2019-04-15 ENCOUNTER — APPOINTMENT (OUTPATIENT)
Dept: PULMONOLOGY | Facility: CLINIC | Age: 79
End: 2019-04-15

## 2019-04-29 ENCOUNTER — NON-APPOINTMENT (OUTPATIENT)
Age: 79
End: 2019-04-29

## 2019-04-29 ENCOUNTER — APPOINTMENT (OUTPATIENT)
Dept: CARDIOLOGY | Facility: CLINIC | Age: 79
End: 2019-04-29
Payer: MEDICARE

## 2019-04-29 VITALS
HEART RATE: 55 BPM | BODY MASS INDEX: 22.32 KG/M2 | WEIGHT: 126 LBS | DIASTOLIC BLOOD PRESSURE: 73 MMHG | SYSTOLIC BLOOD PRESSURE: 179 MMHG | OXYGEN SATURATION: 97 % | HEIGHT: 63 IN

## 2019-04-29 DIAGNOSIS — R00.2 PALPITATIONS: ICD-10-CM

## 2019-04-29 PROCEDURE — 99215 OFFICE O/P EST HI 40 MIN: CPT

## 2019-04-29 PROCEDURE — 93000 ELECTROCARDIOGRAM COMPLETE: CPT

## 2019-04-29 NOTE — DISCUSSION/SUMMARY
[FreeTextEntry1] : The patient was noted to be exhibiting newly-discovered asymptomatic atrial fibrillation with a moderately tachycardic ventricular response (120 beats per minute) on an electrocardiogram performed at the time of a routine follow-up medical evaluation on 12/4/17. Beta-blocker therapy (Toprol-XL 25 mg daily) was initiated at that time for control of the ventricular response, and anticoagulation (Eliquis 5 mg b.i.d.) was initiated at that time to decrease the risk of stroke associated with atrial fibrillation. She was noted to be exhibiting sinus bradycardia at the time for a follow-up visit with me on 12/6/17, and also was noted to be exhibiting predominantly sinus rhythm on a Holter monitor study performed on 12/13/17 (infrequent transient episodes of paroxysmal atrial fibrillation were exhibited, the longest of which was 6 beats in duration at a rate of 140 beats per minute). The patient has been stable from a cardiac symptomatic standpoint since her previous visit here on 11/12/18. Specifically, she does not describe having experienced any signs or symptoms to suggest the development of an anginal syndrome, congestive heart failure, or a hemodynamically-compromising arrhythmia. She is exhibiting sinus bradycardia with non-specific low voltage associated with poor R-wave progression in leads V5-V6 and non-specific repolarization changes on her electrocardiogram today, essentially unchanged from her previous office tracing, allowing for lead placement variation. Her cardiac examination today is remarkable for a bradycardic rate and a soft systolic ejection murmur, essentially unchanged from her previous visit with me. Her blood pressure reading is moderately elevated today.\par \par I have instructed the patient increase the dosage of Norvasc from 5 mg daily to 5 mg twice daily, in an effort to more optimally control her blood pressure. I have asked her to call me if she should have any difficulty tolerating the increased dosage of Norvasc.\par \par I have reviewed the findings of the Holter monitor study of 12/13/17, the exercise stress test on 12/12/17, the echocardiogram of 6/7/18, and the carotid artery Doppler study of 612/18 in detail with the patient today.\par \par I have explained the implications of paroxysmal atrial fibrillation in detail to the patient and her son today, including management strategies.  Although the options of antiarrhythmic therapy and/or an ablation procedure were discussed, as the patient was asymptomatic of the paroxysmal atrial fibrillation when she was exhibiting this arrhythmia on 12/4/17, I have not recommended that either of these options be pursued, unless the ventricular response cannot be adequately controlled with medical therapy. I have recommended to the patient that she continue Toprol-XL at a dosage of 25 mg daily for the time being.  In addition, I have recommended that anticoagulation be continued to reduce the risk of stroke associated with paroxysmal atrial fibrillation, noting that it is highly likely that the patient will exhibit recurrence of this arrhythmia.\par \par I have reviewed the findings of the blood test report a 5/30/18 in detail with the patient today, and I have instructed her to continue Lipitor at a dosage of 20 mg daily for the time being. I have asked the patient to have a copy of her most recent fasting lipid profile and chemistry screen results forwarded to my office for my review and records.\par \par I have again explained to the patient that although her echocardiogram suggested the presence of significant pulmonary hypertension, the right heart catheterization of 8/1/17 revealed a normal pulmonary artery systolic pressure and a normal pulmonary capillary wedge pressure.\par \par The patient will be following up with the neurologist regarding management of her seizure disorder.\par \par I have asked the patient to call me if she should have any questions or problems pertaining to these matters, and especially if she should experience any concerning symptoms. I have otherwise asked her to return to the office in one month for follow-up cardiac evaluation and blood pressure reassessment (on the increased dosage of Norvasc), provided she remains clinically stable in the interim.  Arrangements will be made at that time for follow-up blood testing, echocardiography, and carotid artery Doppler testing.

## 2019-04-29 NOTE — HISTORY OF PRESENT ILLNESS
[FreeTextEntry1] : Mrs. Kaylyn Schultz presented to the office today for cardiac evaluation.\par \par The patient is a 79-year-old female with a history of paroxysmal atrial  fibrillation, hypertension, a dyslipidemia, mild to moderate aortic regurgitation, mild to moderate carotid atherosclerosis, supraventricular ectopy (including non-sustained supraventricular tachycardia exhibited on Holter monitoring), asthma, mild obstructive sleep apnea, moderate pulmonary hypertension, osteoporosis, and transient amnesia, suspected as being related to seizure activity.\par \par The patient has been stable from a cardiac symptomatic standpoint since her previous visit here on 11/12/18. Specifically, she has not experienced chest discomfort or dyspnea on exertion in association with her activities, which include walking 1 mile each morning. She has not noted orthopnea, paroxysmal nocturnal dyspnea, or lower extremity edema. She has recently experienced a pounding sensation in her left upper chest briefly while lying on her left side in bed at night (she has noted a sensation of tachycardia at these times), resolving promptly with rolling over to her right side. She has not experienced recognized recurrence of paroxysmal atrial fibrillation. 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 She experienced an episode of gross hematuria in October of 2018, for which she followed up with a urologist, including having had a CT scan of the abdomen/pelvis performed, which she describes as having been unrevealing. She reports having experienced recurrence of gross hematuria on one occasion in January of 2019, without recurrence since that time.\par \par The patient followed up with her pulmonologist, Dr. Carter, on 10/15/18, regarding asthma, obstructive sleep apnea, and pulmonary hypertension.\par \par Exercise stress testing performed on 12/12/17 revealed the patient to exhibit reduced exercise tolerance, without the inducement of cardiac symptoms, electrocardiographic evidence of myocardial ischemia, or significant arrhythmias at 65% of the age-predicted maximum heart rate.\par \par Echocardiography most recently performed on 6/7/18 revealed moderate left atrial enlargement and mild right atrial enlargement. The ventricles were normal in dimension. Left ventricular wall thickness and wall motion were normal, with a calculated ejection fraction of 61%. Moderate left ventricular diastolic dysfunction was demonstrated. Mild mitral regurgitation, mild aortic regurgitation, and moderate to severe tricuspid regurgitation were demonstrated, with an estimated right ventricular systolic pressure of 52 mmHg.\par \par Carotid artery Doppler testing most recently performed on 6/12/18 revealed mild plaque formation involving the common carotid arteries and the bulbar regions bilaterally. Mild to moderate plaque formation was noted involving the proximal portion of the left internal carotid artery. Moderate to severe plaque formation was noted involving the proximal portion of the right internal carotid artery, with an estimated stenosis in the range of 50-79% involving this region.\par \par Holter monitoring performed from 12/13/17 through 12/14/17 revealed the predominant rhythm throughout the recording to be sinus rhythm at an average rate of 61 beats per minute, with a range of 46-81 beats per minute. Occasional atrial premature contractions were exhibited, including 10 couplets and 15 brief runs of an irregular supraventricular tachycardia, likely representing brief paroxysmal atrial fibrillation (longest episode 6 beats in duration at a rate of 140 beats per minute). Rare ventricular premature contractions were exhibited. No episodes of ventricular tachycardia were exhibited. No significant pauses were exhibited. The patient reported having experienced shortness of breath while watching TV on one occasion, at which time sinus rhythm at a rate of 60 beats per minute was exhibited.\par \par Laboratory studies performed on 5/30/18 revealed cholesterol 162, triglycerides 47, HDL 77, and calculated LDL 72. In the liver chemistries were normal. The glucose level was 87. The BUN and creatinine were 21 and 0.76, respectively. The potassium level was 4.7. The hemoglobin and hematocrit were 13.1 and 40.5, respectively.\par \par Previous History:\par \par I initially evaluated the patient on 11/25/15, regarding transient episodes of amnesia, which were suspicious for seizures. Following that visit here, the patient experienced 2 recurrent transient episodes of amnesia. She had a second neurological opinion with Dr. 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 The patient was subsequently started on Lamotrigine.\par \par At the time of her initial visit here on 11/25/15, the patient informed me that over the preceding few months, she had experienced 4 randomly-occurring episodes of transient memory impairment. She was evaluated by a neurologist, who referred her for an MRI scan of the brain, an MRA scan of the carotid arteries and cerebral circulation, and an EEG. The MRI scan of the brain, performed on 9/28/15, reportedly revealed mild small vessel ischemic disease. The MRA of the carotid arteries, performed on 9/28/15, reportedly revealed a 50-60% stenosis involving the proximal portion of the right internal carotid artery. The EEG, performed on 10/8/15, was reportedly normal.\par \par The patient presented to her internist's office for a routine follow-up medical evaluation on 12/4/17, at which time she was evaluated by Dr. Chary Dang. The patient was noted to be exhibiting newly-discovered atrial fibrillation with an average ventricular rate of 120 beats per minute on her electrocardiogram. The patient was asymptomatic of the atrial fibrillation, and hence, the duration of this arrhythmia was uncertain. Dr. Dang telephoned me while the patient was in her office that day, to inform me that the patient was exhibiting newly-discovered atrial fibrillation with a moderately tachycardic ventricular response. We discussed initiating beta-blocker therapy (Toprol-XL 25 mg daily) for control of the ventricular response, as well as anticoagulation (Eliquis 5 mg b.i.d.) to decrease the risk of stroke associated with atrial fibrillation. The patient was exhibiting sinus bradycardia at the time of a follow-up visit with me on 12/6/17.\par \par I had referred the patient for follow-up echocardiography to reassess the degree of aortic regurgitation, with this study having been performed on 5/8/17. Moderate to severe tricuspid regurgitation was demonstrated, with an estimated right ventricular systolic pressure of 63 mm mercury. I then referred the patient for coronary consultation regarding the elevated pulmonary artery systolic pressure. Her evaluation included a pulmonary ventilation perfusion scan, we'll which was performed on 5/24/17, and was interpreted as being low probability for pulmonary embolus. The patient underwent a sleep study, which was suggestive of mild obstructive sleep apnea. The patient was evaluated by a pulmonary hypertension specialist, was referred for right heart catheterization, which was performed on 8/1/17. Interestingly, this study revealed a normal pulmonary artery pressure and a normal pulmonary capillary wedge pressure.\par \par As far as risk factors for coronary artery disease are concerned, the patient has a history of hypertension and a dyslipidemia. She does have a history of diabetes. She denies a history of cigarette smoking. She does have an immediate family history of premature coronary artery disease in her son, who is also under my care.\par \par Past medical/surgical history according to the patient is significant for a laparoscopic cholecystectomy, right inguinal hernia repair, tonsillectomy, and 3 full-term uncomplicated pregnancies, delivered vaginally.

## 2019-05-19 ENCOUNTER — EMERGENCY (EMERGENCY)
Facility: HOSPITAL | Age: 79
LOS: 1 days | Discharge: HOME | End: 2019-05-19
Attending: EMERGENCY MEDICINE | Admitting: EMERGENCY MEDICINE
Payer: MEDICARE

## 2019-05-19 VITALS
WEIGHT: 121.92 LBS | RESPIRATION RATE: 14 BRPM | SYSTOLIC BLOOD PRESSURE: 160 MMHG | DIASTOLIC BLOOD PRESSURE: 72 MMHG | OXYGEN SATURATION: 100 % | TEMPERATURE: 98 F | HEART RATE: 80 BPM

## 2019-05-19 DIAGNOSIS — Z98.890 OTHER SPECIFIED POSTPROCEDURAL STATES: Chronic | ICD-10-CM

## 2019-05-19 DIAGNOSIS — Z90.49 ACQUIRED ABSENCE OF OTHER SPECIFIED PARTS OF DIGESTIVE TRACT: Chronic | ICD-10-CM

## 2019-05-19 DIAGNOSIS — Z90.89 ACQUIRED ABSENCE OF OTHER ORGANS: Chronic | ICD-10-CM

## 2019-05-19 PROCEDURE — 70486 CT MAXILLOFACIAL W/O DYE: CPT | Mod: 26

## 2019-05-19 PROCEDURE — 72125 CT NECK SPINE W/O DYE: CPT

## 2019-05-19 PROCEDURE — 73562 X-RAY EXAM OF KNEE 3: CPT

## 2019-05-19 PROCEDURE — 72125 CT NECK SPINE W/O DYE: CPT | Mod: 26

## 2019-05-19 PROCEDURE — 70450 CT HEAD/BRAIN W/O DYE: CPT

## 2019-05-19 PROCEDURE — 70486 CT MAXILLOFACIAL W/O DYE: CPT

## 2019-05-19 PROCEDURE — 99284 EMERGENCY DEPT VISIT MOD MDM: CPT | Mod: 25

## 2019-05-19 PROCEDURE — 99284 EMERGENCY DEPT VISIT MOD MDM: CPT

## 2019-05-19 PROCEDURE — 73562 X-RAY EXAM OF KNEE 3: CPT | Mod: 26,LT

## 2019-05-19 PROCEDURE — 70450 CT HEAD/BRAIN W/O DYE: CPT | Mod: 26

## 2019-05-19 NOTE — ED PROVIDER NOTE - CARE PLAN
Principal Discharge DX:	Facial trauma, initial encounter  Secondary Diagnosis:	Contusion of left knee, initial encounter

## 2019-05-19 NOTE — ED ADULT NURSE NOTE - OBJECTIVE STATEMENT
patient comes to ED for evaluation of facial bilateral knees left and left hand and wrist sustained when she tripped while walking patient has contusions abrasions to upper lip left hand and both knees patient ambulatory
Krissy Estes

## 2019-05-19 NOTE — ED PROVIDER NOTE - CARE PROVIDER_API CALL
Alexandra Hooker (DDS; MD)  OralMaxillofacial Surgery  [address], [address]  [place] [address]  Phone: [phone]  Fax: [phone]  Follow Up Time:

## 2019-05-19 NOTE — ED PROVIDER NOTE - ENMT, MLM
Airway patent. Upper lip swelling with abrasion in midline. Slight intrusion of right central incisor. Mandible non tender with no malocclusion.

## 2019-05-19 NOTE — ED PROVIDER NOTE - CONSTITUTIONAL, MLM
normal... Elderly white female, well nourished, awake, alert, oriented to person, place, time/situation and in no apparent distress.

## 2019-05-19 NOTE — ED PROVIDER NOTE - NSFOLLOWUPINSTRUCTIONS_ED_ALL_ED_FT
Rest  Keep wounds clean and dry  Follow-up with oral surgeon in the morning  Soft foods  Return here if needed

## 2019-05-19 NOTE — ED PROVIDER NOTE - OBJECTIVE STATEMENT
78 yo white female with H/O Atrial fibrillation    Hyperlipidemia    Hypertension and    Seizure who tripped and fell walking her dog short while ago today striking face on cement and now presents to ED c/o facial pain, facial abrasions and left knee pain. Did not hit head. Denies any neck pain, jaw pain, chest or abdominal pains, back or hip pains and no LOC. This was a non syncopal trip and fall.

## 2019-05-29 ENCOUNTER — NON-APPOINTMENT (OUTPATIENT)
Age: 79
End: 2019-05-29

## 2019-05-29 ENCOUNTER — APPOINTMENT (OUTPATIENT)
Dept: CARDIOLOGY | Facility: CLINIC | Age: 79
End: 2019-05-29
Payer: MEDICARE

## 2019-05-29 VITALS
WEIGHT: 123 LBS | HEART RATE: 53 BPM | SYSTOLIC BLOOD PRESSURE: 126 MMHG | OXYGEN SATURATION: 100 % | DIASTOLIC BLOOD PRESSURE: 64 MMHG | HEIGHT: 63 IN | BODY MASS INDEX: 21.79 KG/M2

## 2019-05-29 PROBLEM — I48.91 UNSPECIFIED ATRIAL FIBRILLATION: Chronic | Status: ACTIVE | Noted: 2019-05-19

## 2019-05-29 PROBLEM — R56.9 UNSPECIFIED CONVULSIONS: Chronic | Status: ACTIVE | Noted: 2019-05-19

## 2019-05-29 PROCEDURE — 93000 ELECTROCARDIOGRAM COMPLETE: CPT

## 2019-05-29 PROCEDURE — 99215 OFFICE O/P EST HI 40 MIN: CPT

## 2019-05-29 NOTE — PHYSICAL EXAM
[General Appearance - Well Developed] : well developed [Normal Conjunctiva] : the conjunctiva exhibited no abnormalities [No Oral Pallor] : no oral pallor [General Appearance - In No Acute Distress] : no acute distress [Respiration, Rhythm And Depth] : normal respiratory rhythm and effort [Auscultation Breath Sounds / Voice Sounds] : lungs were clear to auscultation bilaterally [Bowel Sounds] : normal bowel sounds [Abdomen Tenderness] : non-tender [Abnormal Walk] : normal gait [Cyanosis, Localized] : no localized cyanosis [Oriented To Time, Place, And Person] : oriented to person, place, and time [] : no rash [Not Palpable] : not palpable [Bradycardia] : bradycardic [No Precordial Heave] : no precordial heave was noted [Normal S1] : normal S1 [Premature Beats] : regular with premature beats [Normal S2] : normal S2 [No Gallop] : no gallop heard [I] : a grade 1 [No Abnormalities] : the abdominal aorta was not enlarged and no bruit was heard [2+] : left 2+ [No Pitting Edema] : no pitting edema present [Apical Thrill] : no thrill palpable at the apex [FreeTextEntry1] : no JVD is appreciated at a 45° angle [Pericardial Rub] : no pericardial rub [Click] : no click [Right Carotid Bruit] : no bruit heard over the right carotid [Left Carotid Bruit] : no bruit heard over the left carotid

## 2019-05-29 NOTE — HISTORY OF PRESENT ILLNESS
[FreeTextEntry1] : Mrs. Kaylyn Schultz presented to the office today for cardiac evaluation. I last evaluated the patient in the office on 4/29/19.\par \par The patient is a 79-year-old female with a history of paroxysmal atrial  fibrillation, hypertension, a dyslipidemia, mild to moderate aortic regurgitation, mild to moderate carotid atherosclerosis, supraventricular ectopy (including non-sustained supraventricular tachycardia exhibited on Holter monitoring), asthma, mild obstructive sleep apnea, moderate pulmonary hypertension, osteoporosis, and transient amnesia, suspected as being related to seizure activity.\par \par At the time of the patient's previous visit here on 4/29/19, I instructed her to increase the dosage of Norvasc from 5 mg daily to twice daily, in an effort to more optimally control her blood pressure. She has been tolerating the increased dosage of this medication.\par \par The patient has been stable from a cardiac symptomatic standpoint since her previous visit here on 4/29/19. Specifically, she has not experienced chest discomfort or dyspnea on exertion in association with her activities, which include walking 1 mile each morning. She has not noted orthopnea, paroxysmal nocturnal dyspnea, or lower extremity edema. She has recently experienced a pounding sensation in her left upper chest briefly while lying on her left side in bed at night (she has noted a sensation of tachycardia at these times), resolving promptly with rolling over to her right side. She has not experienced recognized recurrence of paroxysmal atrial fibrillation. 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 She experienced an episode of gross hematuria in October of 2018, for which she followed up with a urologist, including having had a CT scan of the abdomen/pelvis performed, which she describes as having been unrevealing. She reports having experienced recurrence of gross hematuria on one occasion in January of 2019, without recurrence since that time.\par \par The patient followed up with her pulmonologist, Dr. Carter, on 10/15/18, regarding asthma, obstructive sleep apnea, and pulmonary hypertension.\par \par Exercise stress testing performed on 12/12/17 revealed the patient to exhibit reduced exercise tolerance, without the inducement of cardiac symptoms, electrocardiographic evidence of myocardial ischemia, or significant arrhythmias at 65% of the age-predicted maximum heart rate.\par \par Echocardiography most recently performed on 6/7/18 revealed moderate left atrial enlargement and mild right atrial enlargement. The ventricles were normal in dimension. Left ventricular wall thickness and wall motion were normal, with a calculated ejection fraction of 61%. Moderate left ventricular diastolic dysfunction was demonstrated. Mild mitral regurgitation, mild aortic regurgitation, and moderate to severe tricuspid regurgitation were demonstrated, with an estimated right ventricular systolic pressure of 52 mmHg.\par \par Carotid artery Doppler testing most recently performed on 6/12/18 revealed mild plaque formation involving the common carotid arteries and the bulbar regions bilaterally. Mild to moderate plaque formation was noted involving the proximal portion of the left internal carotid artery. Moderate to severe plaque formation was noted involving the proximal portion of the right internal carotid artery, with an estimated stenosis in the range of 50-79% involving this region.\par \par Holter monitoring performed from 12/13/17 through 12/14/17 revealed the predominant rhythm throughout the recording to be sinus rhythm at an average rate of 61 beats per minute, with a range of 46-81 beats per minute. Occasional atrial premature contractions were exhibited, including 10 couplets and 15 brief runs of an irregular supraventricular tachycardia, likely representing brief paroxysmal atrial fibrillation (longest episode 6 beats in duration at a rate of 140 beats per minute). Rare ventricular premature contractions were exhibited. No episodes of ventricular tachycardia were exhibited. No significant pauses were exhibited. The patient reported having experienced shortness of breath while watching TV on one occasion, at which time sinus rhythm at a rate of 60 beats per minute was exhibited.\par \par Laboratory studies performed on 5/30/18 revealed cholesterol 162, triglycerides 47, HDL 77, and calculated LDL 72. In the liver chemistries were normal. The glucose level was 87. The BUN and creatinine were 21 and 0.76, respectively. The potassium level was 4.7. The hemoglobin and hematocrit were 13.1 and 40.5, respectively.\par \par Previous History:\par \par I initially evaluated the patient on 11/25/15, regarding transient episodes of amnesia, which were suspicious for seizures. Following that visit here, the patient experienced 2 recurrent transient episodes of amnesia. She had a second neurological opinion with Dr. 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 The patient was subsequently started on Lamotrigine.\par \par At the time of her initial visit here on 11/25/15, the patient informed me that over the preceding few months, she had experienced 4 randomly-occurring episodes of transient memory impairment. She was evaluated by a neurologist, who referred her for an MRI scan of the brain, an MRA scan of the carotid arteries and cerebral circulation, and an EEG. The MRI scan of the brain, performed on 9/28/15, reportedly revealed mild small vessel ischemic disease. The MRA of the carotid arteries, performed on 9/28/15, reportedly revealed a 50-60% stenosis involving the proximal portion of the right internal carotid artery. The EEG, performed on 10/8/15, was reportedly normal.\par \par The patient presented to her internist's office for a routine follow-up medical evaluation on 12/4/17, at which time she was evaluated by Dr. Chary Dang. The patient was noted to be exhibiting newly-discovered atrial fibrillation with an average ventricular rate of 120 beats per minute on her electrocardiogram. The patient was asymptomatic of the atrial fibrillation, and hence, the duration of this arrhythmia was uncertain. Dr. Dang telephoned me while the patient was in her office that day, to inform me that the patient was exhibiting newly-discovered atrial fibrillation with a moderately tachycardic ventricular response. We discussed initiating beta-blocker therapy (Toprol-XL 25 mg daily) for control of the ventricular response, as well as anticoagulation (Eliquis 5 mg b.i.d.) to decrease the risk of stroke associated with atrial fibrillation. The patient was exhibiting sinus bradycardia at the time of a follow-up visit with me on 12/6/17.\par \par I had referred the patient for follow-up echocardiography to reassess the degree of aortic regurgitation, with this study having been performed on 5/8/17. Moderate to severe tricuspid regurgitation was demonstrated, with an estimated right ventricular systolic pressure of 63 mm mercury. I then referred the patient for coronary consultation regarding the elevated pulmonary artery systolic pressure. Her evaluation included a pulmonary ventilation perfusion scan, we'll which was performed on 5/24/17, and was interpreted as being low probability for pulmonary embolus. The patient underwent a sleep study, which was suggestive of mild obstructive sleep apnea. The patient was evaluated by a pulmonary hypertension specialist, was referred for right heart catheterization, which was performed on 8/1/17. Interestingly, this study revealed a normal pulmonary artery pressure and a normal pulmonary capillary wedge pressure.\par \par As far as risk factors for coronary artery disease are concerned, the patient has a history of hypertension and a dyslipidemia. She does have a history of diabetes. She denies a history of cigarette smoking. She does have an immediate family history of premature coronary artery disease in her son, who is also under my care.\par \par Past medical/surgical history according to the patient is significant for a laparoscopic cholecystectomy, right inguinal hernia repair, tonsillectomy, and 3 full-term uncomplicated pregnancies, delivered vaginally.

## 2019-05-29 NOTE — DISCUSSION/SUMMARY
[FreeTextEntry1] : The patient was noted to be exhibiting newly-discovered asymptomatic atrial fibrillation with a moderately tachycardic ventricular response (120 beats per minute) on an electrocardiogram performed at the time of a routine follow-up medical evaluation on 12/4/17. Beta-blocker therapy (Toprol-XL 25 mg daily) was initiated at that time for control of the ventricular response, and anticoagulation (Eliquis 5 mg b.i.d.) was initiated at that time to decrease the risk of stroke associated with atrial fibrillation. She was noted to be exhibiting sinus bradycardia at the time for a follow-up visit with me on 12/6/17, and also was noted to be exhibiting predominantly sinus rhythm on a Holter monitor study performed on 12/13/17 (infrequent transient episodes of paroxysmal atrial fibrillation were exhibited, the longest of which was 6 beats in duration at a rate of 140 beats per minute). The patient has been stable from a cardiac symptomatic standpoint since her previous visit here on 4/29/19. Specifically, she does not describe having experienced any signs or symptoms to suggest the development of an anginal syndrome, congestive heart failure, or a hemodynamically-compromising arrhythmia. She is exhibiting sinus bradycardia with non-specific low voltage associated with poor R-wave progression in leads V5-V6 and non-specific repolarization changes on her electrocardiogram today, essentially unchanged from her previous office tracing, allowing for lead placement variation. Her cardiac examination today is remarkable for a bradycardic rate and a soft systolic ejection murmur, essentially unchanged from her previous visit with me. Her blood pressure reading is normal today.\par \par As her blood pressure reading today is normal, I have instructed the patient to continue her antihypertensive medications as presently prescribed time being.\par \par I have reviewed the findings of the Holter monitor study of 12/13/17, the exercise stress test on 12/12/17, the echocardiogram of 6/7/18, and the carotid artery Doppler study of 612/18 in detail with the patient today.\par \par I am referring the patient for follow-up echocardiography at this point to reassess the degree of aortic regurgitation and the degree of pulmonary hypertension. In addition, carotid artery Doppler testing will be performed to reassess the degree of atherosclerosis formation demonstrated on previous study. The patient is going to make arrangements to have these studies performed through our office, and I will telephone her to discuss the findings, once the studies have been completed.\par \par I have explained the implications of paroxysmal atrial fibrillation in detail to the patient and her son today, including management strategies.  Although the options of antiarrhythmic therapy and/or an ablation procedure were discussed, as the patient was asymptomatic of the paroxysmal atrial fibrillation when she was exhibiting this arrhythmia on 12/4/17, I have not recommended that either of these options be pursued, unless the ventricular response cannot be adequately controlled with medical therapy. I have recommended to the patient that she continue Toprol-XL at a dosage of 25 mg daily for the time being.  In addition, I have recommended that anticoagulation be continued to reduce the risk of stroke associated with paroxysmal atrial fibrillation, noting that it is highly likely that the patient will exhibit recurrence of this arrhythmia.\par \par I have reviewed the findings of the blood test report of 5/30/18 in detail with the patient today, and I have instructed her to continue Lipitor at a dosage of 20 mg daily for the time being. I am referring her for a follow-up fasting lipid profile and chemistry screen this morning for reassessment.\par \par I have again explained to the patient that although her echocardiogram suggested the presence of significant pulmonary hypertension, the right heart catheterization of 8/1/17 revealed a normal pulmonary artery systolic pressure and a normal pulmonary capillary wedge pressure.\par \par The patient will be following up with the neurologist regarding management of her seizure disorder.\par \par I have asked the patient to call me if she should have any questions or problems pertaining to these matters, and especially if she should experience any concerning symptoms. I have otherwise asked her to return to the office in 6 months for follow-up cardiac evaluation and blood pressure reassessment, provided she remains clinically stable in the interim.

## 2019-05-30 LAB
ALBUMIN SERPL ELPH-MCNC: 4.2 G/DL
ALP BLD-CCNC: 90 U/L
ALT SERPL-CCNC: 27 U/L
ANION GAP SERPL CALC-SCNC: 10 MMOL/L
AST SERPL-CCNC: 28 U/L
BILIRUB SERPL-MCNC: 0.8 MG/DL
BUN SERPL-MCNC: 20 MG/DL
CALCIUM SERPL-MCNC: 10.2 MG/DL
CHLORIDE SERPL-SCNC: 105 MMOL/L
CHOLEST SERPL-MCNC: 155 MG/DL
CHOLEST/HDLC SERPL: 2.1 RATIO
CO2 SERPL-SCNC: 27 MMOL/L
CREAT SERPL-MCNC: 0.74 MG/DL
GLUCOSE SERPL-MCNC: 107 MG/DL
HDLC SERPL-MCNC: 73 MG/DL
LDLC SERPL CALC-MCNC: 73 MG/DL
POTASSIUM SERPL-SCNC: 5.2 MMOL/L
PROT SERPL-MCNC: 7.3 G/DL
SODIUM SERPL-SCNC: 142 MMOL/L
TRIGL SERPL-MCNC: 45 MG/DL

## 2019-06-04 ENCOUNTER — APPOINTMENT (OUTPATIENT)
Dept: CARDIOLOGY | Facility: CLINIC | Age: 79
End: 2019-06-04
Payer: MEDICARE

## 2019-06-06 ENCOUNTER — APPOINTMENT (OUTPATIENT)
Dept: CARDIOLOGY | Facility: CLINIC | Age: 79
End: 2019-06-06
Payer: MEDICARE

## 2019-06-06 PROCEDURE — 93880 EXTRACRANIAL BILAT STUDY: CPT

## 2019-06-11 ENCOUNTER — APPOINTMENT (OUTPATIENT)
Dept: CARDIOLOGY | Facility: CLINIC | Age: 79
End: 2019-06-11
Payer: MEDICARE

## 2019-06-11 PROCEDURE — 93306 TTE W/DOPPLER COMPLETE: CPT

## 2019-09-06 ENCOUNTER — OTHER (OUTPATIENT)
Age: 79
End: 2019-09-06

## 2019-09-11 ENCOUNTER — MEDICATION RENEWAL (OUTPATIENT)
Age: 79
End: 2019-09-11

## 2019-10-22 NOTE — ED ADULT NURSE NOTE - SKIN CAPILLARY REFILL
[Awake] : awake [Alert] : alert [Acute Distress] : no acute distress [Soft] : soft [Distended] : not distended 2 seconds or less

## 2019-11-04 ENCOUNTER — NON-APPOINTMENT (OUTPATIENT)
Age: 79
End: 2019-11-04

## 2019-11-04 ENCOUNTER — APPOINTMENT (OUTPATIENT)
Dept: CARDIOLOGY | Facility: CLINIC | Age: 79
End: 2019-11-04
Payer: MEDICARE

## 2019-11-04 VITALS
BODY MASS INDEX: 22.32 KG/M2 | OXYGEN SATURATION: 98 % | HEIGHT: 63 IN | SYSTOLIC BLOOD PRESSURE: 158 MMHG | DIASTOLIC BLOOD PRESSURE: 74 MMHG | WEIGHT: 126 LBS | HEART RATE: 59 BPM

## 2019-11-04 PROCEDURE — 99215 OFFICE O/P EST HI 40 MIN: CPT

## 2019-11-04 PROCEDURE — 93000 ELECTROCARDIOGRAM COMPLETE: CPT

## 2019-11-04 NOTE — DISCUSSION/SUMMARY
[FreeTextEntry1] : The patient was noted to be exhibiting newly-discovered asymptomatic atrial fibrillation with a moderately tachycardic ventricular response (120 beats per minute) on an electrocardiogram performed at the time of a routine follow-up medical evaluation on 12/4/17. Beta-blocker therapy (Toprol-XL 25 mg daily) was initiated at that time for control of the ventricular response, and anticoagulation (Eliquis 5 mg b.i.d.) was initiated at that time to decrease the risk of stroke associated with atrial fibrillation. She was noted to be exhibiting sinus bradycardia at the time for a follow-up visit with me on 12/6/17, and also was noted to be exhibiting predominantly sinus rhythm on a Holter monitor study performed on 12/13/17 (infrequent transient episodes of paroxysmal atrial fibrillation were exhibited, the longest of which was 6 beats in duration at a rate of 140 beats per minute). The patient has been stable from a cardiac symptomatic standpoint since her previous visit here on 5/29/19. Specifically, she does not describe having experienced any signs or symptoms to suggest the development of an anginal syndrome, congestive heart failure, or a hemodynamically-compromising arrhythmia. She is exhibiting sinus bradycardia with non-specific low voltage associated with poor R-wave progression in leads V5-V6 and non-specific repolarization changes on her electrocardiogram today, essentially unchanged from her previous office tracing, allowing for lead placement variation. Her cardiac examination today is remarkable for a bradycardic rate and a soft systolic ejection murmur, essentially unchanged from her previous visit with me. Her blood pressure reading is normal today.\par \par As her blood pressure reading today is normal, I have instructed the patient to continue her antihypertensive medications as presently prescribed time being.\par \par I have reviewed the findings of the Holter monitor study of 12/13/17, the exercise stress test of 12/12/17, the echocardiogram of 6/11/19, and the carotid artery Doppler study of 6/6/19 in detail with the patient today.\par \par I have again explained the implications of paroxysmal atrial fibrillation in detail to the patient and her son today, including management strategies.  Although the options of antiarrhythmic therapy and/or an ablation procedure were discussed, as the patient was asymptomatic of the paroxysmal atrial fibrillation when she was exhibiting this arrhythmia on 12/4/17, I have not recommended that either of these options be pursued, unless the ventricular response cannot be adequately controlled with medical therapy. I have recommended to the patient that she continue Toprol-XL at a dosage of 25 mg daily for the time being.  In addition, I have recommended that anticoagulation be continued to reduce the risk of stroke associated with paroxysmal atrial fibrillation, noting that it is highly likely that the patient will exhibit recurrence of this arrhythmia.\par \par I have reviewed the findings of the blood test report of 5/29/19 in detail with the patient today, and I have instructed her to continue Lipitor at a dosage of 20 mg daily for the time being.\par \par I have again explained to the patient that although her echocardiogram suggested the presence of significant pulmonary hypertension, the right heart catheterization of 8/1/17 revealed a normal pulmonary artery systolic pressure and a normal pulmonary capillary wedge pressure.\par \par The patient will be following up with the neurologist regarding management of her seizure disorder.\par \par I find no cardiac contraindication to the patient undergoing knee radiofrequency spinal ablation procedure as scheduled. She will be holding anticoagulation (Eliquis) for 3 days prior to the procedure, and will resume anticoagulation following the procedure, as soon as deemed to be reasonably safe from the performing physician's perspective.\par \par I have asked the patient to call me if she should have any questions or problems pertaining to these matters, and especially if she should experience any concerning symptoms. I have otherwise asked her to return to the office in 6 months for follow-up cardiac evaluation and blood pressure reassessment (after returning from spending the winter in [place]), provided she remains clinically stable in the interim. Follow-up echocardiography and carotid artery Doppler testing will be planned for June of 2020.

## 2019-11-04 NOTE — HISTORY OF PRESENT ILLNESS
[FreeTextEntry1] : Mrs. Kaylyn Schultz presented to the office today for cardiac evaluation. I last evaluated the patient in the office on 5/29/19.\par \par The patient is a 79-year-old female with a history of paroxysmal atrial  fibrillation, hypertension, a dyslipidemia, mild to moderate aortic regurgitation, mild to moderate carotid atherosclerosis, supraventricular ectopy (including non-sustained supraventricular tachycardia exhibited on Holter monitoring), asthma, mild obstructive sleep apnea, moderate pulmonary hypertension, osteoporosis, and transient amnesia, suspected as being related to seizure activity.\par \par The patient has been stable from a cardiac symptomatic standpoint since her previous visit here on 5/29/19. Specifically, she has not experienced chest discomfort or dyspnea on exertion in association with her activities, which include walking 1 mile each morning. She has not noted orthopnea, paroxysmal nocturnal dyspnea, or lower extremity edema. She has recently experienced a pounding sensation in her left upper chest briefly while lying on her left side in bed at night (she has noted a sensation of tachycardia at these times), resolving promptly with rolling over to her right side. She has not experienced recognized recurrence of paroxysmal atrial fibrillation. 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 She experienced an episode of gross hematuria in October of 2018, for which she followed up with a urologist, including having had a CT scan of the abdomen/pelvis performed, which she describes as having been unrevealing. She reports having experienced recurrence of gross hematuria on one occasion in January of 2019, without recurrence since that time.\par \par The patient followed up with her pulmonologist, Dr. Carter, on 10/15/18, regarding asthma, obstructive sleep apnea, and pulmonary hypertension.\par \par Exercise stress testing performed on 12/12/17 revealed the patient to exhibit reduced exercise tolerance, without the inducement of cardiac symptoms, electrocardiographic evidence of myocardial ischemia, or significant arrhythmias at 65% of the age-predicted maximum heart rate.\par \par Echocardiography most recently performed on 6/11/19 revealed moderate bi-atrial enlargement. The ventricles were normal in dimension. Left ventricular wall thickness and wall motion were normal, with an estimated ejection fraction of 70%. Moderate left ventricular diastolic dysfunction was demonstrated. Mild aortic regurgitation, mild mitral regurgitation, mild pulmonic regurgitation, and severe tricuspid regurgitation were demonstrated, with an estimated right ventricular systolic pressure of 48 mmHg. Comparison with a previous study performed on 6/7/18 revealed similar findings.\par \par Carotid artery Doppler testing most recently performed on 6/6/19 revealed mild plaque formation involving the common carotid arteries and moderate plaque formation involving the bulbar regions bilaterally. Moderate plaque formation was noted involving the proximal portion of the left internal carotid artery. Moderate to severe plaque formation was noted involving the proximal portion of the right internal carotid artery, with an estimated stenosis in the range of 50-79% involving this region.\par \par Holter monitoring performed from 12/13/17 through 12/14/17 revealed the predominant rhythm throughout the recording to be sinus rhythm at an average rate of 61 beats per minute, with a range of 46-81 beats per minute. Occasional atrial premature contractions were exhibited, including 10 couplets and 15 brief runs of an irregular supraventricular tachycardia, likely representing brief paroxysmal atrial fibrillation (longest episode 6 beats in duration at a rate of 140 beats per minute). Rare ventricular premature contractions were exhibited. No episodes of ventricular tachycardia were exhibited. No significant pauses were exhibited. The patient reported having experienced shortness of breath while watching TV on one occasion, at which time sinus rhythm at a rate of 60 beats per minute was exhibited.\par \par Laboratory studies performed on 5/29/19 revealed cholesterol 155, triglycerides 45, HDL 73, and calculated LDL 73. The BUN and creatinine were 20 and 0.74, respectively. The potassium level was 5.2. The glucose level was 107. The liver chemistries\par \par Previous History:\par \par I initially evaluated the patient on 11/25/15, regarding transient episodes of amnesia, which were suspicious for seizures. Following that visit here, the patient experienced 2 recurrent transient episodes of amnesia. She had a second neurological opinion with Dr. 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 The patient was subsequently started on Lamotrigine.\par \par At the time of her initial visit here on 11/25/15, the patient informed me that over the preceding few months, she had experienced 4 randomly-occurring episodes of transient memory impairment. She was evaluated by a neurologist, who referred her for an MRI scan of the brain, an MRA scan of the carotid arteries and cerebral circulation, and an EEG. The MRI scan of the brain, performed on 9/28/15, reportedly revealed mild small vessel ischemic disease. The MRA of the carotid arteries, performed on 9/28/15, reportedly revealed a 50-60% stenosis involving the proximal portion of the right internal carotid artery. The EEG, performed on 10/8/15, was reportedly normal.\par \par The patient presented to her internist's office for a routine follow-up medical evaluation on 12/4/17, at which time she was evaluated by Dr. Chary Dang. The patient was noted to be exhibiting newly-discovered atrial fibrillation with an average ventricular rate of 120 beats per minute on her electrocardiogram. The patient was asymptomatic of the atrial fibrillation, and hence, the duration of this arrhythmia was uncertain. Dr. Dang telephoned me while the patient was in her office that day, to inform me that the patient was exhibiting newly-discovered atrial fibrillation with a moderately tachycardic ventricular response. We discussed initiating beta-blocker therapy (Toprol-XL 25 mg daily) for control of the ventricular response, as well as anticoagulation (Eliquis 5 mg b.i.d.) to decrease the risk of stroke associated with atrial fibrillation. The patient was exhibiting sinus bradycardia at the time of a follow-up visit with me on 12/6/17.\par \par I had referred the patient for follow-up echocardiography to reassess the degree of aortic regurgitation, with this study having been performed on 5/8/17. Moderate to severe tricuspid regurgitation was demonstrated, with an estimated right ventricular systolic pressure of 63 mm mercury. I then referred the patient for coronary consultation regarding the elevated pulmonary artery systolic pressure. Her evaluation included a pulmonary ventilation perfusion scan, we'll which was performed on 5/24/17, and was interpreted as being low probability for pulmonary embolus. The patient underwent a sleep study, which was suggestive of mild obstructive sleep apnea. The patient was evaluated by a pulmonary hypertension specialist, was referred for right heart catheterization, which was performed on 8/1/17. Interestingly, this study revealed a normal pulmonary artery pressure and a normal pulmonary capillary wedge pressure.\par \par As far as risk factors for coronary artery disease are concerned, the patient has a history of hypertension and a dyslipidemia. She does have a history of diabetes. She denies a history of cigarette smoking. She does have an immediate family history of premature coronary artery disease in her son, who is also under my care.\par \par Past medical/surgical history according to the patient is significant for a laparoscopic cholecystectomy, right inguinal hernia repair, tonsillectomy, and 3 full-term uncomplicated pregnancies, delivered vaginally.

## 2019-11-05 ENCOUNTER — TRANSCRIPTION ENCOUNTER (OUTPATIENT)
Age: 79
End: 2019-11-05

## 2019-11-25 ENCOUNTER — APPOINTMENT (OUTPATIENT)
Dept: INTERNAL MEDICINE | Facility: CLINIC | Age: 79
End: 2019-11-25
Payer: MEDICARE

## 2019-11-25 VITALS
HEART RATE: 50 BPM | DIASTOLIC BLOOD PRESSURE: 70 MMHG | WEIGHT: 123 LBS | HEIGHT: 62 IN | BODY MASS INDEX: 22.63 KG/M2 | SYSTOLIC BLOOD PRESSURE: 148 MMHG | RESPIRATION RATE: 14 BRPM | OXYGEN SATURATION: 99 % | TEMPERATURE: 97.4 F

## 2019-11-25 PROCEDURE — G0439: CPT

## 2019-11-25 NOTE — PHYSICAL EXAM
[Well Nourished] : well nourished [No Acute Distress] : no acute distress [Well-Appearing] : well-appearing [Well Developed] : well developed [PERRL] : pupils equal round and reactive to light [Normal Sclera/Conjunctiva] : normal sclera/conjunctiva [Normal Outer Ear/Nose] : the outer ears and nose were normal in appearance [EOMI] : extraocular movements intact [No JVD] : no jugular venous distention [No Lymphadenopathy] : no lymphadenopathy [Normal Oropharynx] : the oropharynx was normal [Thyroid Normal, No Nodules] : the thyroid was normal and there were no nodules present [Supple] : supple [No Respiratory Distress] : no respiratory distress  [No Accessory Muscle Use] : no accessory muscle use [Clear to Auscultation] : lungs were clear to auscultation bilaterally [Normal Rate] : normal rate  [Regular Rhythm] : with a regular rhythm [Normal S1, S2] : normal S1 and S2 [No Carotid Bruits] : no carotid bruits [No Murmur] : no murmur heard [No Abdominal Bruit] : a ~M bruit was not heard ~T in the abdomen [Pedal Pulses Present] : the pedal pulses are present [No Varicosities] : no varicosities [No Palpable Aorta] : no palpable aorta [No Edema] : there was no peripheral edema [No Extremity Clubbing/Cyanosis] : no extremity clubbing/cyanosis [Soft] : abdomen soft [Non-distended] : non-distended [No Masses] : no abdominal mass palpated [Non Tender] : non-tender [Normal Posterior Cervical Nodes] : no posterior cervical lymphadenopathy [Normal Bowel Sounds] : normal bowel sounds [No HSM] : no HSM [Normal Anterior Cervical Nodes] : no anterior cervical lymphadenopathy [No CVA Tenderness] : no CVA  tenderness [No Spinal Tenderness] : no spinal tenderness [No Joint Swelling] : no joint swelling [Grossly Normal Strength/Tone] : grossly normal strength/tone [No Rash] : no rash [Coordination Grossly Intact] : coordination grossly intact [No Focal Deficits] : no focal deficits [Normal Gait] : normal gait [Deep Tendon Reflexes (DTR)] : deep tendon reflexes were 2+ and symmetric [Normal Affect] : the affect was normal [Normal Insight/Judgement] : insight and judgment were intact

## 2019-11-25 NOTE — HISTORY OF PRESENT ILLNESS
[de-identified] : 79 y.o. F with PMHx of epilepsy (last seizure 2 years ago), afib, HLD, HTN, chronic LBP presents as new pt to establish care. Pt is switching PCPs. Pt is currently getting over URI. Has stuffy nose and sore throat. Responding well to OTC meds. No cough, SOB, fever/chills. Pt sees Dr. Valderrama for her epilepsy, had f/u appt 2 weeks ago. Pt sees dr. Barnett at [facility] and gets radiofrequency ablation treatments which are helping her back pain. \par

## 2019-11-25 NOTE — PHYSICAL EXAM
[Well Nourished] : well nourished [No Acute Distress] : no acute distress [Well-Appearing] : well-appearing [Well Developed] : well developed [Normal Sclera/Conjunctiva] : normal sclera/conjunctiva [PERRL] : pupils equal round and reactive to light [Normal Outer Ear/Nose] : the outer ears and nose were normal in appearance [EOMI] : extraocular movements intact [No Lymphadenopathy] : no lymphadenopathy [No JVD] : no jugular venous distention [Normal Oropharynx] : the oropharynx was normal [Thyroid Normal, No Nodules] : the thyroid was normal and there were no nodules present [Supple] : supple [No Respiratory Distress] : no respiratory distress  [No Accessory Muscle Use] : no accessory muscle use [Clear to Auscultation] : lungs were clear to auscultation bilaterally [Normal Rate] : normal rate  [Regular Rhythm] : with a regular rhythm [Normal S1, S2] : normal S1 and S2 [No Carotid Bruits] : no carotid bruits [No Murmur] : no murmur heard [No Abdominal Bruit] : a ~M bruit was not heard ~T in the abdomen [Pedal Pulses Present] : the pedal pulses are present [No Varicosities] : no varicosities [No Palpable Aorta] : no palpable aorta [No Edema] : there was no peripheral edema [No Extremity Clubbing/Cyanosis] : no extremity clubbing/cyanosis [Soft] : abdomen soft [Non Tender] : non-tender [Non-distended] : non-distended [No Masses] : no abdominal mass palpated [No HSM] : no HSM [Normal Bowel Sounds] : normal bowel sounds [Normal Posterior Cervical Nodes] : no posterior cervical lymphadenopathy [Normal Anterior Cervical Nodes] : no anterior cervical lymphadenopathy [No CVA Tenderness] : no CVA  tenderness [No Spinal Tenderness] : no spinal tenderness [No Joint Swelling] : no joint swelling [No Rash] : no rash [Grossly Normal Strength/Tone] : grossly normal strength/tone [Normal Gait] : normal gait [No Focal Deficits] : no focal deficits [Coordination Grossly Intact] : coordination grossly intact [Deep Tendon Reflexes (DTR)] : deep tendon reflexes were 2+ and symmetric [Normal Affect] : the affect was normal [Normal Insight/Judgement] : insight and judgment were intact

## 2019-11-25 NOTE — PLAN
[FreeTextEntry1] : HCM: \par -pt will return for fasting labs \par -UTD with flu and pneumonia vaccine \par \par Afib: followed by card-dr. jimenez \par Epilepsy: followed by neuro-Dr. Valderrama

## 2019-11-25 NOTE — HISTORY OF PRESENT ILLNESS
[de-identified] : 79 y.o. F with PMHx of epilepsy (last seizure 2 years ago), afib, HLD, HTN, chronic LBP presents as new pt to establish care. Pt is switching PCPs. Pt is currently getting over URI. Has stuffy nose and sore throat. Responding well to OTC meds. No cough, SOB, fever/chills. Pt sees Dr. Valderrama for her epilepsy, had f/u appt 2 weeks ago. Pt sees dr. Barnett at [facility] and gets radiofrequency ablation treatments which are helping her back pain. \par

## 2019-11-26 LAB
25(OH)D3 SERPL-MCNC: 34.3 NG/ML
ALBUMIN SERPL ELPH-MCNC: 4.1 G/DL
ALP BLD-CCNC: 85 U/L
ALT SERPL-CCNC: 37 U/L
ANION GAP SERPL CALC-SCNC: 12 MMOL/L
AST SERPL-CCNC: 21 U/L
BASOPHILS # BLD AUTO: 0.04 K/UL
BASOPHILS NFR BLD AUTO: 0.4 %
BILIRUB SERPL-MCNC: 0.9 MG/DL
BUN SERPL-MCNC: 21 MG/DL
CALCIUM SERPL-MCNC: 9.8 MG/DL
CHLORIDE SERPL-SCNC: 103 MMOL/L
CHOLEST SERPL-MCNC: 155 MG/DL
CHOLEST/HDLC SERPL: 1.9 RATIO
CO2 SERPL-SCNC: 26 MMOL/L
CREAT SERPL-MCNC: 0.66 MG/DL
EOSINOPHIL # BLD AUTO: 0.31 K/UL
EOSINOPHIL NFR BLD AUTO: 3.3 %
ESTIMATED AVERAGE GLUCOSE: 117 MG/DL
FOLATE SERPL-MCNC: >20 NG/ML
GLUCOSE SERPL-MCNC: 89 MG/DL
HBA1C MFR BLD HPLC: 5.7 %
HCT VFR BLD CALC: 42.8 %
HDLC SERPL-MCNC: 83 MG/DL
HGB BLD-MCNC: 13.1 G/DL
IMM GRANULOCYTES NFR BLD AUTO: 0.3 %
LDLC SERPL CALC-MCNC: 64 MG/DL
LYMPHOCYTES # BLD AUTO: 2.38 K/UL
LYMPHOCYTES NFR BLD AUTO: 25.5 %
MAN DIFF?: NORMAL
MCHC RBC-ENTMCNC: 29.4 PG
MCHC RBC-ENTMCNC: 30.6 GM/DL
MCV RBC AUTO: 96.2 FL
MONOCYTES # BLD AUTO: 0.74 K/UL
MONOCYTES NFR BLD AUTO: 7.9 %
NEUTROPHILS # BLD AUTO: 5.85 K/UL
NEUTROPHILS NFR BLD AUTO: 62.6 %
PLATELET # BLD AUTO: 243 K/UL
POTASSIUM SERPL-SCNC: 4.4 MMOL/L
PROT SERPL-MCNC: 6.3 G/DL
RBC # BLD: 4.45 M/UL
RBC # FLD: 13.2 %
SODIUM SERPL-SCNC: 141 MMOL/L
TRIGL SERPL-MCNC: 39 MG/DL
TSH SERPL-ACNC: 2.84 UIU/ML
VIT B12 SERPL-MCNC: 1081 PG/ML
WBC # FLD AUTO: 9.35 K/UL

## 2020-03-11 NOTE — ED ADULT NURSE NOTE - NS ED NURSE LEVEL OF CONSCIOUSNESS SPEECH
REVIEW OF SYSTEMS:    GENERAL:  Patient denies fever, chills, tiredness, malaise, but complains of headaches.  EYES:  Patient denies double vision, pain, burning and itching, but complains of blurred vision and glaucoma.   NEUROLOGIC:  Patient denies tremors, dizzy spells, numbness, tingling, vision change, loss of balance.  ENDOCRINE:  Patient denies excessive thirst, heat intolerance, lymphnode enlargement, but complains of tired/sluggishness.  GASTROINTESTINAL:  Patient denies abdominal pain, nausea/vomiting, indigestion/heartburn, diarrhea, constipation.  CARDIOVASCUALR:  Patient denies chest pain, varicose veins, but complains of high blood pressure.  SKIN:  Patient denies boils, persistent itching, acne, but complains of skin rashes.  MUSCULOSKELETAL:  Patient denies joint pain, neck pain, back pain, leg swelling, but complains of thigh pain.  ENT/MOUTH:  Patient denies ear infections, sore throats, hearing loss, but complains of sinus problems.  RESPIRATORY:  Patient denies frequent cough, shortness of breath, but complains of wheezing.   :  Patient denies urine retention, painful urination, urinary frequency, nocturia, but complains of blood in urine.  HEME/LYMPHATICE:  Patient denies swollen glands, blood clotting problems.   PSYCHOLOGICAL:  Patient denies anxiety, depression.     Speaking Coherently

## 2020-03-24 ENCOUNTER — RX RENEWAL (OUTPATIENT)
Age: 80
End: 2020-03-24

## 2020-03-25 ENCOUNTER — RX RENEWAL (OUTPATIENT)
Age: 80
End: 2020-03-25

## 2020-05-27 ENCOUNTER — NON-APPOINTMENT (OUTPATIENT)
Age: 80
End: 2020-05-27

## 2020-05-27 ENCOUNTER — APPOINTMENT (OUTPATIENT)
Dept: CARDIOLOGY | Facility: CLINIC | Age: 80
End: 2020-05-27
Payer: MEDICARE

## 2020-05-27 VITALS
OXYGEN SATURATION: 97 % | HEART RATE: 52 BPM | WEIGHT: 126 LBS | HEIGHT: 62 IN | SYSTOLIC BLOOD PRESSURE: 154 MMHG | DIASTOLIC BLOOD PRESSURE: 65 MMHG | BODY MASS INDEX: 23.19 KG/M2

## 2020-05-27 DIAGNOSIS — I27.20 PULMONARY HYPERTENSION, UNSPECIFIED: ICD-10-CM

## 2020-05-27 PROCEDURE — 99215 OFFICE O/P EST HI 40 MIN: CPT

## 2020-05-27 PROCEDURE — 93000 ELECTROCARDIOGRAM COMPLETE: CPT

## 2020-06-11 ENCOUNTER — EMERGENCY (EMERGENCY)
Facility: HOSPITAL | Age: 80
LOS: 1 days | Discharge: HOME | End: 2020-06-11
Attending: EMERGENCY MEDICINE | Admitting: EMERGENCY MEDICINE
Payer: MEDICARE

## 2020-06-11 VITALS
TEMPERATURE: 98 F | WEIGHT: 123.9 LBS | DIASTOLIC BLOOD PRESSURE: 70 MMHG | RESPIRATION RATE: 18 BRPM | HEIGHT: 62 IN | OXYGEN SATURATION: 98 % | SYSTOLIC BLOOD PRESSURE: 176 MMHG | HEART RATE: 67 BPM

## 2020-06-11 VITALS
SYSTOLIC BLOOD PRESSURE: 155 MMHG | DIASTOLIC BLOOD PRESSURE: 65 MMHG | HEART RATE: 74 BPM | OXYGEN SATURATION: 98 % | RESPIRATION RATE: 16 BRPM | TEMPERATURE: 98 F

## 2020-06-11 DIAGNOSIS — Z90.49 ACQUIRED ABSENCE OF OTHER SPECIFIED PARTS OF DIGESTIVE TRACT: Chronic | ICD-10-CM

## 2020-06-11 DIAGNOSIS — Z98.890 OTHER SPECIFIED POSTPROCEDURAL STATES: Chronic | ICD-10-CM

## 2020-06-11 DIAGNOSIS — Z90.89 ACQUIRED ABSENCE OF OTHER ORGANS: Chronic | ICD-10-CM

## 2020-06-11 LAB
ALBUMIN SERPL ELPH-MCNC: 3.9 G/DL — SIGNIFICANT CHANGE UP (ref 3.3–5)
ALP SERPL-CCNC: 91 U/L — SIGNIFICANT CHANGE UP (ref 40–120)
ALT FLD-CCNC: 35 U/L — SIGNIFICANT CHANGE UP (ref 12–78)
ANION GAP SERPL CALC-SCNC: 9 MMOL/L — SIGNIFICANT CHANGE UP (ref 5–17)
AST SERPL-CCNC: 25 U/L — SIGNIFICANT CHANGE UP (ref 15–37)
BILIRUB SERPL-MCNC: 1.1 MG/DL — SIGNIFICANT CHANGE UP (ref 0.2–1.2)
BUN SERPL-MCNC: 24 MG/DL — HIGH (ref 7–23)
CALCIUM SERPL-MCNC: 10 MG/DL — SIGNIFICANT CHANGE UP (ref 8.5–10.1)
CHLORIDE SERPL-SCNC: 107 MMOL/L — SIGNIFICANT CHANGE UP (ref 96–108)
CO2 SERPL-SCNC: 23 MMOL/L — SIGNIFICANT CHANGE UP (ref 22–31)
CREAT SERPL-MCNC: 0.85 MG/DL — SIGNIFICANT CHANGE UP (ref 0.5–1.3)
GLUCOSE SERPL-MCNC: 168 MG/DL — HIGH (ref 70–99)
HCT VFR BLD CALC: 40.2 % — SIGNIFICANT CHANGE UP (ref 34.5–45)
HGB BLD-MCNC: 13.2 G/DL — SIGNIFICANT CHANGE UP (ref 11.5–15.5)
MCHC RBC-ENTMCNC: 29.3 PG — SIGNIFICANT CHANGE UP (ref 27–34)
MCHC RBC-ENTMCNC: 32.8 GM/DL — SIGNIFICANT CHANGE UP (ref 32–36)
MCV RBC AUTO: 89.1 FL — SIGNIFICANT CHANGE UP (ref 80–100)
NRBC # BLD: 0 /100 WBCS — SIGNIFICANT CHANGE UP (ref 0–0)
PLATELET # BLD AUTO: 228 K/UL — SIGNIFICANT CHANGE UP (ref 150–400)
POTASSIUM SERPL-MCNC: 4.4 MMOL/L — SIGNIFICANT CHANGE UP (ref 3.5–5.3)
POTASSIUM SERPL-SCNC: 4.4 MMOL/L — SIGNIFICANT CHANGE UP (ref 3.5–5.3)
PROT SERPL-MCNC: 7.4 G/DL — SIGNIFICANT CHANGE UP (ref 6–8.3)
RBC # BLD: 4.51 M/UL — SIGNIFICANT CHANGE UP (ref 3.8–5.2)
RBC # FLD: 12.6 % — SIGNIFICANT CHANGE UP (ref 10.3–14.5)
SODIUM SERPL-SCNC: 139 MMOL/L — SIGNIFICANT CHANGE UP (ref 135–145)
TROPONIN I SERPL-MCNC: <.015 NG/ML — SIGNIFICANT CHANGE UP (ref 0.01–0.04)
WBC # BLD: 11.63 K/UL — HIGH (ref 3.8–10.5)
WBC # FLD AUTO: 11.63 K/UL — HIGH (ref 3.8–10.5)

## 2020-06-11 PROCEDURE — 70450 CT HEAD/BRAIN W/O DYE: CPT | Mod: 26

## 2020-06-11 PROCEDURE — 99285 EMERGENCY DEPT VISIT HI MDM: CPT

## 2020-06-11 PROCEDURE — 36415 COLL VENOUS BLD VENIPUNCTURE: CPT

## 2020-06-11 PROCEDURE — 99284 EMERGENCY DEPT VISIT MOD MDM: CPT | Mod: 25

## 2020-06-11 PROCEDURE — 93005 ELECTROCARDIOGRAM TRACING: CPT

## 2020-06-11 PROCEDURE — 70450 CT HEAD/BRAIN W/O DYE: CPT

## 2020-06-11 PROCEDURE — 99284 EMERGENCY DEPT VISIT MOD MDM: CPT

## 2020-06-11 PROCEDURE — 93010 ELECTROCARDIOGRAM REPORT: CPT

## 2020-06-11 PROCEDURE — 85027 COMPLETE CBC AUTOMATED: CPT

## 2020-06-11 PROCEDURE — 80053 COMPREHEN METABOLIC PANEL: CPT

## 2020-06-11 PROCEDURE — 96374 THER/PROPH/DIAG INJ IV PUSH: CPT

## 2020-06-11 PROCEDURE — 84484 ASSAY OF TROPONIN QUANT: CPT

## 2020-06-11 RX ORDER — SODIUM CHLORIDE 9 MG/ML
1000 INJECTION INTRAMUSCULAR; INTRAVENOUS; SUBCUTANEOUS ONCE
Refills: 0 | Status: COMPLETED | OUTPATIENT
Start: 2020-06-11 | End: 2020-06-11

## 2020-06-11 RX ORDER — ONDANSETRON 8 MG/1
4 TABLET, FILM COATED ORAL ONCE
Refills: 0 | Status: COMPLETED | OUTPATIENT
Start: 2020-06-11 | End: 2020-06-11

## 2020-06-11 RX ADMIN — SODIUM CHLORIDE 1000 MILLILITER(S): 9 INJECTION INTRAMUSCULAR; INTRAVENOUS; SUBCUTANEOUS at 11:48

## 2020-06-11 RX ADMIN — ONDANSETRON 4 MILLIGRAM(S): 8 TABLET, FILM COATED ORAL at 11:48

## 2020-06-11 NOTE — ED PROVIDER NOTE - OBJECTIVE STATEMENT
81 yo white female, while walking dog 4-days ago tripped and fell. States did not injure anything and did not hit head. Was well and at baseline until this morning while walking developed dizziness/lighthheadedness followed by nausea and vomiting as well as dull ill defined chest heaviness. Dr. Parr was called and ultimately patient was sent here. Patient denies any infectious symptoms and also denies any headache, weakness or paresthesias.

## 2020-06-11 NOTE — ED ADULT NURSE NOTE - OBJECTIVE STATEMENT
patient came in ED from home with c/o dizziness, nausea, + vomiting, and chest heaviness. alert and oriented x 4. afebrile. nonlabored respiration. patient denies pain at this time. patient stated last Sunday she had a tripped and fall incident landed on a grassy area, denies LOC. Hx of Epilepsy.

## 2020-06-11 NOTE — CONSULT NOTE ADULT - ATTENDING COMMENTS
The patient was personally seen and examined, in addition to being examined and evaluated by housestaff.  All elements of the note were edited where appropriate.    palpitations, not clearly representative of arrhythmia, though is entitled to paf  lightheadedness, now resolved  could conceivably be cva sx, alycia noting decr dose of ac and off asa.   if can ambulate without sxs can dc for outpt neurol eval. if recurrent sxs with ambulation with admit and do inpt neuro eval

## 2020-06-11 NOTE — ED ADULT NURSE NOTE - PSH
H/O hernia repair    History of tonsillectomy    No significant past surgical history    Status post cholecystectomy

## 2020-06-11 NOTE — ED PROVIDER NOTE - CONSTITUTIONAL, MLM
normal... Well appearing, elderly white female, awake, alert, oriented to person, place, time/situation and in no apparent distress.

## 2020-06-11 NOTE — CONSULT NOTE ADULT - ASSESSMENT
Pt is an 80-year-old female with a history of paroxysmal atrial fibrillation, hypertension, dyslipidemia, pre-diabetes, mild to moderate aortic regurgitation, mild to moderate carotid atherosclerosis, nonsustained SVT (exhibited on Holter monitoring), asthma, mild obstructive sleep apnea, moderate pulmonary hypertension, osteoporosis, and transient amnesia (suspected as being related to seizure activity), p/w dizziness, n/v, and chest discomfort.    She presents with atypical chest pain. With regards to her fall 4 days ago, she denies ____________________.    - Evidence of ischemia, trops? will f/u third set, pt asymptomatic  - CKs? flat versus trending up: argue for/against acute ischemia  - Evidence of volume overload     #pAF  - Changes on EKG compared to previous  - Previous ECHO on _______ shows _______________ with EF: __%, ______________ valvular disease noted    #HTN  - BP   , continue/hold home BP meds, monitor routine hemodynamics    - Monitor and replete lytes, keep K>4, Mg>2  - Other cardiovascular workup will depend on clinical course.  - All other workup per primary team  - Will follow Pt is an 80-year-old female with a history of paroxysmal atrial fibrillation, hypertension, dyslipidemia, pre-diabetes, mild to moderate aortic regurgitation, mild to moderate carotid atherosclerosis, nonsustained SVT (exhibited on Holter monitoring), asthma, mild obstructive sleep apnea, moderate pulmonary hypertension, osteoporosis, and transient amnesia (suspected as being related to seizure activity), p/w dizziness, n/v, and chest discomfort.    She presents with atypical chest pain. With regards to her fall 4 days ago, she denies ____________________.     #atypical chest pain  - low evidence of ischemia, 1st trops negative, trend cardiac enzymes  - CKs? flat versus trending up: argue for/against acute ischemia  - no evidence of volume overload clinically    #pAF  - current EKG shows _________________prev EKG from 5/27/2020 show sinus bradycardia at 52bpm.  - Previous ECHO on 6/11/2019 shows normal LVSF with EF: 70%, severe TR noted  - Carotid US 6/6/2019 shows >50% stenosis R external CA, 50-75% diameter reduction R ICA. <50% diameter reduction in L ICA.  - repeat carotid dopplers here, check TTE    #HTN  - BP mildly elevated, continue home Toprol XL 25mg, losartan 100mg qd    - monitor routine hemodynamics    - Monitor and replete lytes, keep K>4, Mg>2  - Other cardiovascular workup will depend on clinical course.  - All other workup per primary team  - Will follow Pt is an 80-year-old female with a history of paroxysmal atrial fibrillation, hypertension, dyslipidemia, pre-diabetes, mild to moderate aortic regurgitation, mild to moderate carotid atherosclerosis, nonsustained SVT (exhibited on Holter monitoring), asthma, mild obstructive sleep apnea, moderate pulmonary hypertension, osteoporosis, and transient amnesia (suspected as being related to seizure activity), p/w dizziness, n/v, and chest discomfort.    She presents with atypical chest discomfort. This discomfort is described as a racing sensation, worsened with exertion, and not relieved at rest. With regards to her fall 4 days ago, she denies any presyncopal aura, LOC, head trauma. Suspect this was a mechanical fall. Regarding her current symptoms, she may be experiencing paroxysms of her known Afib. Doubt ACS. Recommend overnight telemetry monitoring. She will need Holter monitoring after discharge to assess for underlying arrhythmias.    #atypical chest discomfort  - pt with dizziness/lightheadedness w/ racing sensation  - low evidence of ischemia, 1st trops negative, trend cardiac enzymes x2 more sets  - no evidence of volume overload clinically  - neuro eval, telemetry monitoring    #pAF  - current EKG shows NSR at 74bpm, with premature supraventricular complexes -- prev EKG from 5/27/2020 show sinus bradycardia at 52bpm.  - Previous ECHO on 6/11/2019 shows normal LVSF with EF: 70%, severe TR noted  - Carotid US 6/6/2019 shows >50% stenosis R external CA, 50-75% diameter reduction R ICA. <50% diameter reduction in L ICA.  - repeat carotid dopplers here, check TTE  - continue Eliquis 2.5mg BID, toprol XL 25mg qd    #HTN  - BP mildly elevated, continue home Toprol XL 25mg, losartan 100mg qd    - monitor routine hemodynamics    - Monitor and replete lytes, keep K>4, Mg>2  - Other cardiovascular workup will depend on clinical course.  - All other workup per primary team  - Will follow Pt is an 80-year-old female with a history of paroxysmal atrial fibrillation, hypertension, dyslipidemia, pre-diabetes, mild to moderate aortic regurgitation, mild to moderate carotid atherosclerosis, nonsustained SVT (exhibited on Holter monitoring), asthma, mild obstructive sleep apnea, moderate pulmonary hypertension, osteoporosis, and transient amnesia (suspected as being related to seizure activity), p/w dizziness, n/v, and chest discomfort.    She presents with dizziness and lightheadedness, with mild racing sensation in her heart. With regards to her fall 4 days ago, she denies any presyncopal aura, LOC, head trauma. Suspect this was a mechanical fall. Her current symptoms are consistent with vertigo. Doubt this is an acute CVA, ACS or any significant arrhythmia, as her EKG here shows NSR @74bpm, with premature SVCs. She does not have any obvious neuro deficits, her trops are WNL, and she does not have any overt chest pain. Recommend outpt neuro eval. She is overdue for her outpt neuro followup with Dr. Valderrama at [facility]. Would see if she can ambulate here without significant issue, and have her follow up in our office if there are no immediate contraindications.    #dizziness/lightheadedness  - CTH unremarkable  - EKG grossly unrevealing, sinus rhythm  - low evidence of ischemia, 1st trops negative, no further need to trend  - no evidence of volume overload clinically  - outpatient neuro eval    #pAF  - current EKG shows NSR at 74bpm, with premature supraventricular complexes -- prev EKG from 5/27/2020 show sinus bradycardia at 52bpm.  - Previous ECHO on 6/11/2019 shows normal LVSF with EF: 70%, severe TR noted  - Carotid US 6/6/2019 shows >50% stenosis R external CA, 50-75% diameter reduction R ICA. <50% diameter reduction in L ICA.  - continue Eliquis 2.5mg BID, toprol XL 25mg qd    #HTN  - BP mildly elevated, continue home Toprol XL 25mg, losartan 100mg qd    - monitor routine hemodynamics    - Monitor and replete lytes, keep K>4, Mg>2  - Other cardiovascular workup will depend on clinical course.  - All other workup per primary team  - Will follow

## 2020-06-11 NOTE — CONSULT NOTE ADULT - SUBJECTIVE AND OBJECTIVE BOX
[facility] - Mert Parr, Paulo Manuel, Edmundo Lira Savella, [place]  Office Number: [phone]    Initial Consult Note    CHIEF COMPLAINT: Patient is a 80y old  Female who presents with a chief complaint of dizziness    HPI: Pt is an 80-year-old female with a history of paroxysmal atrial fibrillation, hypertension, dyslipidemia, pre-diabetes, mild to moderate aortic regurgitation, mild to moderate carotid atherosclerosis, nonsustained SVT (exhibited on Holter monitoring), asthma, mild obstructive sleep apnea, moderate pulmonary hypertension, osteoporosis, and transient amnesia (suspected as being related to seizure activity). She was recently seen by her cardiologist Dr. Painting as an outpatient on 5/27/2020, where she was noted to be without any chest discomfort, FERRELL, orthopnea, or PND. At this visit she also denied symptoms of PAF, syncope, or near-syncope. Of note, she saw her cardiologist in [place] in 2/2020, at which time aspirin was discontinued secondary to the patient experiencing easy bruisability, and her dosage of Eliquis was reduced from 5 mg b.i.d. to 2.5 mg b.i.d., in light of her advanced age. She follows with pulm Dr. Carter for her PND, asthma, and orthopnea.    She now presents with dizziness, lightheadedness, n/v and chest discomfort while walking today. She states she fell 4 days ago while walking her dog outdoors.     Interval Hx: Pt seen and examined at bedside.    PAST MEDICAL & SURGICAL HISTORY:  Seizure  Atrial fibrillation  Hypertension  Hyperlipidemia  Seizure disorder: least episode 1.5 years ago  Mitral regurgitation: Mild  Aortic valve regurgitation: mild  Carotid stenosis  HLD (hyperlipidemia)  HTN (hypertension)  No significant past surgical history  History of tonsillectomy  H/O hernia repair  Status post cholecystectomy      SOCIAL HISTORY:  No tobacco, ethanol, or drug abuse.    FAMILY HISTORY:    No family history of acute MI or sudden cardiac death.    MEDICATIONS  (STANDING):    MEDICATIONS  (PRN):      Allergies    No Known Allergies    Intolerances        REVIEW OF SYSTEMS:    CONSTITUTIONAL: No weakness, fevers or chills  EYES/ENT: No visual changes;  No vertigo or throat pain   NECK: No pain or stiffness  RESPIRATORY: No cough, wheezing, hemoptysis; No shortness of breath  CARDIOVASCULAR: No chest pain or palpitations  GASTROINTESTINAL: No abdominal pain. No nausea, vomiting, or hematemesis; No diarrhea or constipation. No melena or hematochezia.  GENITOURINARY: No dysuria, frequency or hematuria  NEUROLOGICAL: No numbness or weakness  SKIN: No itching or rash  All other review of systems is negative unless indicated above    VITAL SIGNS:   Vital Signs Last 24 Hrs  T(C): 36.8 (11 Jun 2020 10:58), Max: 36.8 (11 Jun 2020 10:58)  T(F): 98.2 (11 Jun 2020 10:58), Max: 98.2 (11 Jun 2020 10:58)  HR: 67 (11 Jun 2020 10:58) (67 - 67)  BP: 176/70 (11 Jun 2020 10:58) (176/70 - 176/70)  BP(mean): --  RR: 18 (11 Jun 2020 10:58) (18 - 18)  SpO2: 98% (11 Jun 2020 10:58) (98% - 98%)    I&O's Summary      On Exam:    Constitutional: NAD, alert and oriented x 3  Lungs:  Non-labored, breath sounds are clear bilaterally, No wheezing, rales or rhonchi  Cardiovascular: RRR.  S1 and S2 positive.  No murmurs, rubs, gallops or clicks  Gastrointestinal: Bowel Sounds present, soft, nontender.   Lymph: No peripheral edema. No cervical lymphadenopathy.  Neurological: Alert, no focal deficits  Skin: No rashes or ulcers   Psych:  Mood & affect appropriate.    LABS: All Labs Reviewed:                        13.2   11.63 )-----------( 228      ( 11 Jun 2020 11:47 )             40.2     11 Jun 2020 11:47    139    |  107    |  24     ----------------------------<  168    4.4     |  23     |  0.85     Ca    10.0       11 Jun 2020 11:47    TPro  7.4    /  Alb  3.9    /  TBili  1.1    /  DBili  x      /  AST  25     /  ALT  35     /  AlkPhos  91     11 Jun 2020 11:47      CARDIAC MARKERS ( 11 Jun 2020 11:47 )  <.015 ng/mL / x     / x     / x     / x          Blood Culture: [facility] - Mert Parr, Paulo Manuel, Edmundo Lira Savella, [place]  Office Number: [phone]    Initial Consult Note    CHIEF COMPLAINT: Patient is a 80y old  Female who presents with a chief complaint of dizziness    HPI: Pt is an 80-year-old female with a history of paroxysmal atrial fibrillation, hypertension, dyslipidemia, pre-diabetes, mild to moderate aortic regurgitation, mild to moderate carotid atherosclerosis, nonsustained SVT (exhibited on Holter monitoring), asthma, mild obstructive sleep apnea, moderate pulmonary hypertension, osteoporosis, and transient amnesia (suspected as being related to seizure activity). She was recently seen by her cardiologist Dr. Painting as an outpatient on 5/27/2020, where she was noted to be without any chest discomfort, FERRELL, orthopnea, or PND. At this visit she also denied symptoms of PAF, syncope, or near-syncope. Of note, she saw her cardiologist in [place] in 2/2020, at which time aspirin was discontinued secondary to the patient experiencing easy bruisability, and her dosage of Eliquis was reduced from 5 mg b.i.d. to 2.5 mg b.i.d., in light of her advanced age. She follows with pulm Dr. Carter for her PND, asthma, and orthopnea.    She now presents with dizziness, lightheadedness, n/v and sensation of heart racing while walking today. Of note she states she fell 4 days ago while walking her dog outdoors. She states she did not feel dizzy or lightheaded when she fell, did not have LOC or head trauma. States she was able to get up immediately and walked home feeling well. She attributes her fall to her dog that pulled her suddenly. States the next morning she felt a little 'woozy', but thought nothing of it. She states today, she was getting dressed when she felt dizzy and lightheaded suddenly, felt the room was spinning, and had to sit down. States she felt her heart racing at this time, and felt nauseous but denies CP, SOB.     Interval Hx: Pt seen and examined at bedside. States she feels dizzy laying down, states feels heart racing slightly. Denies current CP, SOB, cough. Denies abd pain, LE swelling, current n/v.     PAST MEDICAL & SURGICAL HISTORY:  Seizure  Atrial fibrillation  Hypertension  Hyperlipidemia  Seizure disorder: least episode 1.5 years ago  Mitral regurgitation: Mild  Aortic valve regurgitation: mild  Carotid stenosis  HLD (hyperlipidemia)  HTN (hypertension)  No significant past surgical history  History of tonsillectomy  H/O hernia repair  Status post cholecystectomy      SOCIAL HISTORY:  No tobacco, ethanol, or drug abuse.    FAMILY HISTORY:    No family history of acute MI or sudden cardiac death.  F- stroke age 75    MEDICATIONS  (STANDING):  amlodipine 5mg qd  eliquis 2.5mg BID  losartan 100mg qd  atorvastatin 20mg qd    MEDICATIONS  (PRN):      Allergies    No Known Allergies    Intolerances        REVIEW OF SYSTEMS:    CONSTITUTIONAL: No weakness, fevers or chills  EYES/ENT: No visual changes;  (+) vertigo, no throat pain   NECK: No pain or stiffness  RESPIRATORY: No cough, wheezing, hemoptysis; No shortness of breath  CARDIOVASCULAR: No chest pain , (+) palpitations  GASTROINTESTINAL: No abdominal pain.(+) nausea,  no vomiting, or hematemesis; No diarrhea or constipation. No melena or hematochezia.  GENITOURINARY: No dysuria, frequency or hematuria  NEUROLOGICAL: No numbness or weakness  SKIN: No itching or rash  All other review of systems is negative unless indicated above    VITAL SIGNS:   Vital Signs Last 24 Hrs  T(C): 36.8 (11 Jun 2020 10:58), Max: 36.8 (11 Jun 2020 10:58)  T(F): 98.2 (11 Jun 2020 10:58), Max: 98.2 (11 Jun 2020 10:58)  HR: 67 (11 Jun 2020 10:58) (67 - 67)  BP: 176/70 (11 Jun 2020 10:58) (176/70 - 176/70)  BP(mean): --  RR: 18 (11 Jun 2020 10:58) (18 - 18)  SpO2: 98% (11 Jun 2020 10:58) (98% - 98%)    I&O's Summary      On Exam:    Constitutional: NAD, alert and oriented x 3  Lungs:  Non-labored, breath sounds are clear bilaterally, No wheezing, rales or rhonchi  Cardiovascular: RRR. S1 and S2 positive. No murmurs, rubs, gallops or clicks  Gastrointestinal: Bowel Sounds present, soft, nontender.   Lymph: No peripheral edema. No cervical lymphadenopathy.  Neurological: Alert, no focal deficits  Skin: No rashes or ulcers   Psych:  Mood & affect appropriate.    LABS: All Labs Reviewed:                        13.2   11.63 )-----------( 228      ( 11 Jun 2020 11:47 )             40.2     11 Jun 2020 11:47    139    |  107    |  24     ----------------------------<  168    4.4     |  23     |  0.85     Ca    10.0       11 Jun 2020 11:47    TPro  7.4    /  Alb  3.9    /  TBili  1.1    /  DBili  x      /  AST  25     /  ALT  35     /  AlkPhos  91     11 Jun 2020 11:47      CARDIAC MARKERS ( 11 Jun 2020 11:47 )  <.015 ng/mL / x     / x     / x     / x          Blood Culture:

## 2020-06-11 NOTE — ED PROVIDER NOTE - CARE PROVIDER_API CALL
Kashif Painting  CARDIOVASCULAR DISEASE  [address] [place] [address]  Phone: [phone]  Fax: [phone]  Follow Up Time:

## 2020-06-11 NOTE — ED ADULT NURSE NOTE - NSIMPLEMENTINTERV_GEN_ALL_ED
Implemented All Fall Risk Interventions:  [place] to call system. Call bell, personal items and telephone within reach. Instruct patient to call for assistance. Room bathroom lighting operational. Non-slip footwear when patient is off stretcher. Physically safe environment: no spills, clutter or unnecessary equipment. Stretcher in lowest position, wheels locked, appropriate side rails in place. Provide visual cue, wrist band, yellow gown, etc. Monitor gait and stability. Monitor for mental status changes and reorient to person, place, and time. Review medications for side effects contributing to fall risk. Reinforce activity limits and safety measures with patient and family.

## 2020-06-11 NOTE — ED PROVIDER NOTE - PATIENT PORTAL LINK FT
You can access the FollowMyHealth Patient Portal offered by [facility] by registering at the following website: [email or web address]. By joining [facility]’s FollowMyHealth portal, you will also be able to view your health information using other applications (apps) compatible with our system.

## 2020-06-11 NOTE — ED PROVIDER NOTE - PROGRESS NOTE DETAILS
Seen and evaluated by Dr. Bates. Patient ambulatory well in ED. May go home and he will have his group follow-up with patient as outpatient.

## 2020-06-11 NOTE — ED ADULT NURSE NOTE - PMH
Aortic valve regurgitation  mild  Atrial fibrillation    Carotid stenosis    HLD (hyperlipidemia)    HTN (hypertension)    Hyperlipidemia    Hypertension    Mitral regurgitation  Mild  Seizure    Seizure disorder  least episode 1.5 years ago

## 2020-06-11 NOTE — ED ADULT NURSE NOTE - TEMPLATE
D/c home ambulatory with family. Prescriptions given. Good verbalized understanding of follow up care. Cardiac

## 2020-06-22 ENCOUNTER — APPOINTMENT (OUTPATIENT)
Dept: CARDIOLOGY | Facility: CLINIC | Age: 80
End: 2020-06-22
Payer: MEDICARE

## 2020-06-22 PROCEDURE — 93306 TTE W/DOPPLER COMPLETE: CPT

## 2020-06-22 PROCEDURE — 93880 EXTRACRANIAL BILAT STUDY: CPT

## 2020-08-10 ENCOUNTER — APPOINTMENT (OUTPATIENT)
Dept: INTERNAL MEDICINE | Facility: CLINIC | Age: 80
End: 2020-08-10
Payer: MEDICARE

## 2020-08-10 VITALS
RESPIRATION RATE: 14 BRPM | OXYGEN SATURATION: 97 % | SYSTOLIC BLOOD PRESSURE: 122 MMHG | TEMPERATURE: 97.6 F | WEIGHT: 123 LBS | HEIGHT: 62 IN | DIASTOLIC BLOOD PRESSURE: 70 MMHG | HEART RATE: 66 BPM | BODY MASS INDEX: 22.63 KG/M2

## 2020-08-10 DIAGNOSIS — Z82.49 FAMILY HISTORY OF ISCHEMIC HEART DISEASE AND OTHER DISEASES OF THE CIRCULATORY SYSTEM: ICD-10-CM

## 2020-08-10 PROCEDURE — G0439: CPT

## 2020-08-10 RX ORDER — APIXABAN 5 MG/1
5 TABLET, FILM COATED ORAL
Qty: 60 | Refills: 5 | Status: DISCONTINUED | COMMUNITY
Start: 2017-12-04 | End: 2020-08-10

## 2020-08-10 NOTE — HISTORY OF PRESENT ILLNESS
[de-identified] : cpe\par \par Takes tylenol and melatonin to sleep at night.\par Has h/o epilepsy. \par Pt fell in June and scraped herself. \par Had food poisoning a week later on 6/11/2020.

## 2020-08-10 NOTE — PHYSICAL EXAM
[No Acute Distress] : no acute distress [Well-Appearing] : well-appearing [Well Developed] : well developed [Well Nourished] : well nourished [EOMI] : extraocular movements intact [Normal Sclera/Conjunctiva] : normal sclera/conjunctiva [PERRL] : pupils equal round and reactive to light [No JVD] : no jugular venous distention [Normal Oropharynx] : the oropharynx was normal [Normal Outer Ear/Nose] : the outer ears and nose were normal in appearance [No Lymphadenopathy] : no lymphadenopathy [Supple] : supple [Thyroid Normal, No Nodules] : the thyroid was normal and there were no nodules present [No Accessory Muscle Use] : no accessory muscle use [No Respiratory Distress] : no respiratory distress  [Clear to Auscultation] : lungs were clear to auscultation bilaterally [Regular Rhythm] : with a regular rhythm [Normal Rate] : normal rate  [Normal S1, S2] : normal S1 and S2 [No Abdominal Bruit] : a ~M bruit was not heard ~T in the abdomen [No Carotid Bruits] : no carotid bruits [No Murmur] : no murmur heard [No Varicosities] : no varicosities [No Edema] : there was no peripheral edema [Pedal Pulses Present] : the pedal pulses are present [No Palpable Aorta] : no palpable aorta [No Extremity Clubbing/Cyanosis] : no extremity clubbing/cyanosis [Soft] : abdomen soft [Normal Appearance] : normal in appearance [Non-distended] : non-distended [Non Tender] : non-tender [Normal Bowel Sounds] : normal bowel sounds [No HSM] : no HSM [No Masses] : no abdominal mass palpated [Normal Anterior Cervical Nodes] : no anterior cervical lymphadenopathy [Normal Posterior Cervical Nodes] : no posterior cervical lymphadenopathy [No CVA Tenderness] : no CVA  tenderness [No Joint Swelling] : no joint swelling [No Spinal Tenderness] : no spinal tenderness [No Rash] : no rash [Grossly Normal Strength/Tone] : grossly normal strength/tone [No Focal Deficits] : no focal deficits [Normal Gait] : normal gait [Coordination Grossly Intact] : coordination grossly intact [Normal Affect] : the affect was normal [Normal Insight/Judgement] : insight and judgment were intact [de-identified] : sun exposure changes

## 2020-08-10 NOTE — HEALTH RISK ASSESSMENT
[No] : In the past 12 months have you used drugs other than those required for medical reasons? No [Any fall with injury in past year] : Patient reported fall with injury in the past year [0] : 2) Feeling down, depressed, or hopeless: Not at all (0) [None] : None [Fully functional (bathing, dressing, toileting, transferring, walking, feeding)] : Fully functional (bathing, dressing, toileting, transferring, walking, feeding) [Fully functional (using the telephone, shopping, preparing meals, housekeeping, doing laundry, using] : Fully functional and needs no help or supervision to perform IADLs (using the telephone, shopping, preparing meals, housekeeping, doing laundry, using transportation, managing medications and managing finances) [] : No [Language] : denies difficulty with language [Change in mental status noted] : No change in mental status noted [Reports changes in hearing] : Reports no changes in hearing [Reports changes in vision] : Reports no changes in vision [Reports changes in dental health] : Reports no changes in dental health

## 2020-09-21 ENCOUNTER — APPOINTMENT (OUTPATIENT)
Dept: INTERNAL MEDICINE | Facility: CLINIC | Age: 80
End: 2020-09-21
Payer: MEDICARE

## 2020-09-21 VITALS
HEIGHT: 62 IN | TEMPERATURE: 97.7 F | OXYGEN SATURATION: 96 % | HEART RATE: 67 BPM | WEIGHT: 124 LBS | SYSTOLIC BLOOD PRESSURE: 130 MMHG | DIASTOLIC BLOOD PRESSURE: 60 MMHG | BODY MASS INDEX: 22.82 KG/M2 | RESPIRATION RATE: 14 BRPM

## 2020-09-21 DIAGNOSIS — J30.9 ALLERGIC RHINITIS, UNSPECIFIED: ICD-10-CM

## 2020-09-21 PROCEDURE — 99213 OFFICE O/P EST LOW 20 MIN: CPT

## 2020-09-21 RX ORDER — FLUTICASONE PROPIONATE 50 UG/1
50 SPRAY, METERED NASAL DAILY
Qty: 1 | Refills: 1 | Status: ACTIVE | COMMUNITY
Start: 2020-09-21 | End: 1900-01-01

## 2020-09-21 NOTE — REVIEW OF SYSTEMS
[Fever] : no fever [Chills] : no chills [Fatigue] : fatigue [Postnasal Drip] : postnasal drip [Negative] : Psychiatric

## 2020-09-21 NOTE — HISTORY OF PRESENT ILLNESS
[FreeTextEntry8] : cc: allergy symptoms\par \par Pt had flu shot on 9/11/2020 and since then feels washed out. Walks every morning with her dog for 20 minutes. Has not had a problem with her dog. Feels tired. Has seasonal allergies and thinks her symptoms are from allergies. She went to [employer] over the weekend. She keeps busy. Yesterday took 2 naps which she usually does not do. Does have a little runny nose.

## 2020-10-09 ENCOUNTER — RX RENEWAL (OUTPATIENT)
Age: 80
End: 2020-10-09

## 2020-11-01 LAB
25(OH)D3 SERPL-MCNC: 42.2 NG/ML
ALBUMIN SERPL ELPH-MCNC: 4.4 G/DL
ALP BLD-CCNC: 111 U/L
ALT SERPL-CCNC: 24 U/L
ANION GAP SERPL CALC-SCNC: 9 MMOL/L
APPEARANCE: CLEAR
AST SERPL-CCNC: 22 U/L
BACTERIA: NEGATIVE
BASOPHILS # BLD AUTO: 0.06 K/UL
BASOPHILS NFR BLD AUTO: 0.5 %
BILIRUB SERPL-MCNC: 0.8 MG/DL
BILIRUBIN URINE: NEGATIVE
BLOOD URINE: ABNORMAL
BUN SERPL-MCNC: 18 MG/DL
CALCIUM SERPL-MCNC: 10.2 MG/DL
CHLORIDE SERPL-SCNC: 102 MMOL/L
CHOLEST SERPL-MCNC: 157 MG/DL
CO2 SERPL-SCNC: 30 MMOL/L
COLOR: NORMAL
CREAT SERPL-MCNC: 0.83 MG/DL
EOSINOPHIL # BLD AUTO: 0.59 K/UL
EOSINOPHIL NFR BLD AUTO: 5.2 %
ESTIMATED AVERAGE GLUCOSE: 117 MG/DL
FOLATE SERPL-MCNC: >20 NG/ML
GLUCOSE QUALITATIVE U: NEGATIVE
GLUCOSE SERPL-MCNC: 93 MG/DL
HBA1C MFR BLD HPLC: 5.7 %
HCT VFR BLD CALC: 42.2 %
HDLC SERPL-MCNC: 80 MG/DL
HGB BLD-MCNC: 13.3 G/DL
HYALINE CASTS: 3 /LPF
IMM GRANULOCYTES NFR BLD AUTO: 0.4 %
KETONES URINE: NEGATIVE
LDLC SERPL CALC-MCNC: 67 MG/DL
LEUKOCYTE ESTERASE URINE: NEGATIVE
LYMPHOCYTES # BLD AUTO: 2.56 K/UL
LYMPHOCYTES NFR BLD AUTO: 22.7 %
MAN DIFF?: NORMAL
MCHC RBC-ENTMCNC: 29.5 PG
MCHC RBC-ENTMCNC: 31.5 GM/DL
MCV RBC AUTO: 93.6 FL
MICROSCOPIC-UA: NORMAL
MONOCYTES # BLD AUTO: 0.88 K/UL
MONOCYTES NFR BLD AUTO: 7.8 %
NEUTROPHILS # BLD AUTO: 7.15 K/UL
NEUTROPHILS NFR BLD AUTO: 63.4 %
NITRITE URINE: NEGATIVE
NONHDLC SERPL-MCNC: 77 MG/DL
PH URINE: 6.5
PLATELET # BLD AUTO: 279 K/UL
POTASSIUM SERPL-SCNC: 4.3 MMOL/L
PROT SERPL-MCNC: 6.9 G/DL
PROTEIN URINE: ABNORMAL
RBC # BLD: 4.51 M/UL
RBC # FLD: 12.5 %
RED BLOOD CELLS URINE: 3 /HPF
SODIUM SERPL-SCNC: 140 MMOL/L
SPECIFIC GRAVITY URINE: 1.02
SQUAMOUS EPITHELIAL CELLS: 2 /HPF
TRIGL SERPL-MCNC: 48 MG/DL
TSH SERPL-ACNC: 1.99 UIU/ML
URATE SERPL-MCNC: 2.9 MG/DL
UROBILINOGEN URINE: NORMAL
VIT B12 SERPL-MCNC: 1133 PG/ML
WBC # FLD AUTO: 11.28 K/UL
WHITE BLOOD CELLS URINE: 2 /HPF

## 2020-11-02 ENCOUNTER — RX RENEWAL (OUTPATIENT)
Age: 80
End: 2020-11-02

## 2020-11-09 ENCOUNTER — NON-APPOINTMENT (OUTPATIENT)
Age: 80
End: 2020-11-09

## 2020-11-09 ENCOUNTER — APPOINTMENT (OUTPATIENT)
Dept: CARDIOLOGY | Facility: CLINIC | Age: 80
End: 2020-11-09
Payer: MEDICARE

## 2020-11-09 VITALS
SYSTOLIC BLOOD PRESSURE: 148 MMHG | WEIGHT: 123 LBS | BODY MASS INDEX: 22.63 KG/M2 | DIASTOLIC BLOOD PRESSURE: 68 MMHG | OXYGEN SATURATION: 99 % | HEART RATE: 47 BPM | HEIGHT: 62 IN

## 2020-11-09 PROCEDURE — 93000 ELECTROCARDIOGRAM COMPLETE: CPT

## 2020-11-09 PROCEDURE — 99072 ADDL SUPL MATRL&STAF TM PHE: CPT

## 2020-11-09 PROCEDURE — 99215 OFFICE O/P EST HI 40 MIN: CPT

## 2020-12-05 ENCOUNTER — RX RENEWAL (OUTPATIENT)
Age: 80
End: 2020-12-05

## 2020-12-07 ENCOUNTER — RX RENEWAL (OUTPATIENT)
Age: 80
End: 2020-12-07

## 2020-12-10 ENCOUNTER — RX RENEWAL (OUTPATIENT)
Age: 80
End: 2020-12-10

## 2021-03-25 ENCOUNTER — RX RENEWAL (OUTPATIENT)
Age: 81
End: 2021-03-25

## 2021-04-05 ENCOUNTER — RX RENEWAL (OUTPATIENT)
Age: 81
End: 2021-04-05

## 2021-04-24 ENCOUNTER — RX RENEWAL (OUTPATIENT)
Age: 81
End: 2021-04-24

## 2021-04-28 ENCOUNTER — APPOINTMENT (OUTPATIENT)
Dept: INTERNAL MEDICINE | Facility: CLINIC | Age: 81
End: 2021-04-28
Payer: MEDICARE

## 2021-04-28 VITALS
DIASTOLIC BLOOD PRESSURE: 60 MMHG | SYSTOLIC BLOOD PRESSURE: 150 MMHG | WEIGHT: 122 LBS | HEART RATE: 64 BPM | BODY MASS INDEX: 22.31 KG/M2 | OXYGEN SATURATION: 98 % | RESPIRATION RATE: 14 BRPM | TEMPERATURE: 97.8 F

## 2021-04-28 PROCEDURE — 99214 OFFICE O/P EST MOD 30 MIN: CPT

## 2021-04-28 PROCEDURE — 99072 ADDL SUPL MATRL&STAF TM PHE: CPT

## 2021-04-28 RX ORDER — ALENDRONATE SODIUM 70 MG/1
70 TABLET ORAL
Qty: 4 | Refills: 5 | Status: DISCONTINUED | COMMUNITY
Start: 2020-12-07 | End: 2021-04-28

## 2021-04-28 NOTE — PHYSICAL EXAM
[Normal] : affect was normal and insight and judgment were intact [de-identified] : trace edema bilateral [de-identified] : kyphosis

## 2021-04-28 NOTE — HISTORY OF PRESENT ILLNESS
[FreeTextEntry1] : BP check and f/u [de-identified] : c/o back pain. 1.5 yrs ago had pain shots in her back. Bothers her worse in the afternoon. Has appt with Dr Zayas on Fridays. \par Was taking melatonin 10 mg. Not sleeping well through the night. \par Years ago had GB removed. Since then has frequent pieces of BM. She is used to it and not bothering her. \par h/o osteoporosis and took fosamax in the past. She refuses to take any osteoporosis meds because she is afraid of side effects. \par She does an exercise program that is on TV at 6 am every morning. Also goes out for a walk. \par Goes to [place] every winter. \par Her neurologist is Dr Sykes for epilepsy.

## 2021-04-29 LAB
ALBUMIN SERPL ELPH-MCNC: 4.5 G/DL
ALP BLD-CCNC: 105 U/L
ALT SERPL-CCNC: 26 U/L
ANION GAP SERPL CALC-SCNC: 12 MMOL/L
AST SERPL-CCNC: 26 U/L
BASOPHILS # BLD AUTO: 0.05 K/UL
BASOPHILS NFR BLD AUTO: 0.6 %
BILIRUB SERPL-MCNC: 1.2 MG/DL
BUN SERPL-MCNC: 19 MG/DL
CALCIUM SERPL-MCNC: 10.3 MG/DL
CHLORIDE SERPL-SCNC: 101 MMOL/L
CHOLEST SERPL-MCNC: 156 MG/DL
CO2 SERPL-SCNC: 26 MMOL/L
CREAT SERPL-MCNC: 0.81 MG/DL
EOSINOPHIL # BLD AUTO: 0.4 K/UL
EOSINOPHIL NFR BLD AUTO: 4.5 %
ESTIMATED AVERAGE GLUCOSE: 120 MG/DL
FOLATE SERPL-MCNC: >20 NG/ML
GLUCOSE SERPL-MCNC: 100 MG/DL
HBA1C MFR BLD HPLC: 5.8 %
HCT VFR BLD CALC: 42.3 %
HDLC SERPL-MCNC: 84 MG/DL
HGB BLD-MCNC: 13.4 G/DL
IMM GRANULOCYTES NFR BLD AUTO: 0.5 %
LDLC SERPL CALC-MCNC: 64 MG/DL
LYMPHOCYTES # BLD AUTO: 2.53 K/UL
LYMPHOCYTES NFR BLD AUTO: 28.6 %
MAN DIFF?: NORMAL
MCHC RBC-ENTMCNC: 29.5 PG
MCHC RBC-ENTMCNC: 31.7 GM/DL
MCV RBC AUTO: 93 FL
MONOCYTES # BLD AUTO: 0.72 K/UL
MONOCYTES NFR BLD AUTO: 8.1 %
NEUTROPHILS # BLD AUTO: 5.12 K/UL
NEUTROPHILS NFR BLD AUTO: 57.7 %
NONHDLC SERPL-MCNC: 72 MG/DL
PLATELET # BLD AUTO: 249 K/UL
POTASSIUM SERPL-SCNC: 4.5 MMOL/L
PROT SERPL-MCNC: 6.9 G/DL
RBC # BLD: 4.55 M/UL
RBC # FLD: 12.6 %
SODIUM SERPL-SCNC: 139 MMOL/L
TRIGL SERPL-MCNC: 41 MG/DL
VIT B12 SERPL-MCNC: 963 PG/ML
WBC # FLD AUTO: 8.86 K/UL

## 2021-05-05 ENCOUNTER — NON-APPOINTMENT (OUTPATIENT)
Age: 81
End: 2021-05-05

## 2021-05-05 ENCOUNTER — APPOINTMENT (OUTPATIENT)
Dept: CARDIOLOGY | Facility: CLINIC | Age: 81
End: 2021-05-05
Payer: MEDICARE

## 2021-05-05 VITALS
WEIGHT: 123 LBS | HEART RATE: 53 BPM | SYSTOLIC BLOOD PRESSURE: 159 MMHG | OXYGEN SATURATION: 100 % | DIASTOLIC BLOOD PRESSURE: 60 MMHG | BODY MASS INDEX: 22.63 KG/M2 | HEIGHT: 62 IN

## 2021-05-05 PROCEDURE — 93000 ELECTROCARDIOGRAM COMPLETE: CPT

## 2021-05-05 PROCEDURE — 99215 OFFICE O/P EST HI 40 MIN: CPT

## 2021-05-05 PROCEDURE — 99072 ADDL SUPL MATRL&STAF TM PHE: CPT

## 2021-06-01 ENCOUNTER — APPOINTMENT (OUTPATIENT)
Dept: CARDIOLOGY | Facility: CLINIC | Age: 81
End: 2021-06-01
Payer: MEDICARE

## 2021-06-01 PROCEDURE — 99072 ADDL SUPL MATRL&STAF TM PHE: CPT

## 2021-06-01 PROCEDURE — 93306 TTE W/DOPPLER COMPLETE: CPT

## 2021-06-01 PROCEDURE — 93880 EXTRACRANIAL BILAT STUDY: CPT

## 2021-06-29 ENCOUNTER — NON-APPOINTMENT (OUTPATIENT)
Age: 81
End: 2021-06-29

## 2021-07-08 ENCOUNTER — OUTPATIENT (OUTPATIENT)
Dept: OUTPATIENT SERVICES | Facility: HOSPITAL | Age: 81
LOS: 1 days | End: 2021-07-08
Payer: MEDICARE

## 2021-07-08 DIAGNOSIS — M54.16 RADICULOPATHY, LUMBAR REGION: ICD-10-CM

## 2021-07-08 DIAGNOSIS — Z98.890 OTHER SPECIFIED POSTPROCEDURAL STATES: Chronic | ICD-10-CM

## 2021-07-08 DIAGNOSIS — Z90.89 ACQUIRED ABSENCE OF OTHER ORGANS: Chronic | ICD-10-CM

## 2021-07-08 DIAGNOSIS — Z90.49 ACQUIRED ABSENCE OF OTHER SPECIFIED PARTS OF DIGESTIVE TRACT: Chronic | ICD-10-CM

## 2021-07-08 PROCEDURE — 62323 NJX INTERLAMINAR LMBR/SAC: CPT

## 2021-08-19 ENCOUNTER — APPOINTMENT (OUTPATIENT)
Dept: UROLOGY | Facility: CLINIC | Age: 81
End: 2021-08-19
Payer: MEDICARE

## 2021-08-19 VITALS
WEIGHT: 120 LBS | HEIGHT: 62 IN | OXYGEN SATURATION: 98 % | DIASTOLIC BLOOD PRESSURE: 64 MMHG | HEART RATE: 57 BPM | BODY MASS INDEX: 22.08 KG/M2 | SYSTOLIC BLOOD PRESSURE: 188 MMHG

## 2021-08-19 PROCEDURE — 99203 OFFICE O/P NEW LOW 30 MIN: CPT

## 2021-08-19 NOTE — REVIEW OF SYSTEMS
[Feeling Tired] : feeling tired [Eyesight Problems] : eyesight problems [both] : pain during and after intercourse [denies] : denies pain with orgasm [Genital bacterial infection] : genital bacterial infection [Blood in urine that you can see] : blood visible in urine [Wake up at night to urinate  How many times?  ___] : wakes up to urinate [unfilled] times during the night [Strong urge to urinate] : strong urge to urinate [Joint Swelling] : joint swelling [Anxiety] : anxiety [Negative] : Heme/Lymph

## 2021-08-19 NOTE — ASSESSMENT
[FreeTextEntry1] : Renal cyst:\par Discussed that Renal cysts are a common finding on routine radiological studies. Autopsy studies in patients over the age of 50 reveal greater than a 50% chance of having at least one simple renal cyst.\par And that renal cysts may be classified as simple or complex depending how they look on imaging. Discussed complexity of renal cysts and its implications as regards malignancy. \par Will review CT scan. \par \par Hydronephrosis:\par Will get CT Urogram. \par \par Return to office after CT scan. \par

## 2021-08-27 ENCOUNTER — RX RENEWAL (OUTPATIENT)
Age: 81
End: 2021-08-27

## 2021-09-02 ENCOUNTER — APPOINTMENT (OUTPATIENT)
Dept: UROLOGY | Facility: CLINIC | Age: 81
End: 2021-09-02
Payer: MEDICARE

## 2021-09-02 VITALS
BODY MASS INDEX: 22.08 KG/M2 | DIASTOLIC BLOOD PRESSURE: 87 MMHG | HEART RATE: 100 BPM | HEIGHT: 62 IN | WEIGHT: 120 LBS | SYSTOLIC BLOOD PRESSURE: 145 MMHG | OXYGEN SATURATION: 98 %

## 2021-09-02 DIAGNOSIS — N13.30 UNSPECIFIED HYDRONEPHROSIS: ICD-10-CM

## 2021-09-02 DIAGNOSIS — N28.1 CYST OF KIDNEY, ACQUIRED: ICD-10-CM

## 2021-09-02 PROCEDURE — 99213 OFFICE O/P EST LOW 20 MIN: CPT

## 2021-09-13 PROBLEM — N28.1 RENAL CYST: Status: ACTIVE | Noted: 2021-06-30

## 2021-09-13 PROBLEM — N13.30 HYDRONEPHROSIS OF LEFT KIDNEY: Status: ACTIVE | Noted: 2021-08-19

## 2021-09-13 NOTE — HISTORY OF PRESENT ILLNESS
[FreeTextEntry1] : 80 yo female presents for follow up. \par Had CT Urogram. \par \par Initially seen for Renal cyst and Hydronephrosis. \par Had been having off and on suprapubic pain, 6/10. \par Had Renal and Bladder Ultrasound, found to have renal cyst and hydronephrosis. \par No history of kidney stone. \par Daytime frequency is every 2-4 hours or so. Nocturia of 2-3 x. \par Endorsed off and on dysuria. \par Denied hematuria, fever, chills or rigors.\par Non smoker. \par Has been having small hard bowel movements. \par

## 2021-09-13 NOTE — ASSESSMENT
Home care nurse Lani called to report pt's pain being a 10/10 after taking Tylenol and Tramadol.  Home care nurse not sure what to do or who to call.  Previous note from nurse practitioner recommended seeing primary care or med onc but pt is unwilling so it was now recommended that they go to ED for pain control.  Home care nurse Lani was in agreement with plan and will encourage pt to go to ED.   [FreeTextEntry1] : Reviewed outside records. \par CT scan(8/26/21):\par 1 cm left lower pole simple Renal cyst. \par No hydroureteronephrosis. \par \par Renal cyst:\par No further work up recommended. \par \par Hydronephrosis:\par No hydroureteronephrosis on CT scan. \par \par Dysuria: \par Recommended good oral hydration. \par Consider Estrogen after discussing with Gynecology. \par \par Return to office as needed.

## 2021-09-15 ENCOUNTER — RX RENEWAL (OUTPATIENT)
Age: 81
End: 2021-09-15

## 2021-10-26 ENCOUNTER — RX RENEWAL (OUTPATIENT)
Age: 81
End: 2021-10-26

## 2021-10-29 ENCOUNTER — NON-APPOINTMENT (OUTPATIENT)
Age: 81
End: 2021-10-29

## 2021-10-29 ENCOUNTER — APPOINTMENT (OUTPATIENT)
Dept: OPHTHALMOLOGY | Facility: CLINIC | Age: 81
End: 2021-10-29
Payer: MEDICARE

## 2021-10-29 PROCEDURE — 92015 DETERMINE REFRACTIVE STATE: CPT

## 2021-10-29 PROCEDURE — 92004 COMPRE OPH EXAM NEW PT 1/>: CPT

## 2021-11-08 ENCOUNTER — NON-APPOINTMENT (OUTPATIENT)
Age: 81
End: 2021-11-08

## 2021-11-08 ENCOUNTER — APPOINTMENT (OUTPATIENT)
Dept: CARDIOLOGY | Facility: CLINIC | Age: 81
End: 2021-11-08
Payer: MEDICARE

## 2021-11-08 VITALS
HEIGHT: 62 IN | DIASTOLIC BLOOD PRESSURE: 77 MMHG | HEART RATE: 52 BPM | SYSTOLIC BLOOD PRESSURE: 161 MMHG | BODY MASS INDEX: 22.08 KG/M2 | OXYGEN SATURATION: 98 % | WEIGHT: 120 LBS

## 2021-11-08 DIAGNOSIS — R00.1 BRADYCARDIA, UNSPECIFIED: ICD-10-CM

## 2021-11-08 DIAGNOSIS — R03.0 ELEVATED BLOOD-PRESSURE READING, W/OUT DIAGNOSIS OF HYPERTENSION: ICD-10-CM

## 2021-11-08 PROCEDURE — 99215 OFFICE O/P EST HI 40 MIN: CPT

## 2021-11-08 PROCEDURE — 93000 ELECTROCARDIOGRAM COMPLETE: CPT

## 2021-11-11 ENCOUNTER — APPOINTMENT (OUTPATIENT)
Dept: CARDIOLOGY | Facility: CLINIC | Age: 81
End: 2021-11-11
Payer: MEDICARE

## 2021-11-11 ENCOUNTER — APPOINTMENT (OUTPATIENT)
Dept: INTERNAL MEDICINE | Facility: CLINIC | Age: 81
End: 2021-11-11
Payer: MEDICARE

## 2021-11-11 VITALS
HEART RATE: 68 BPM | HEIGHT: 62 IN | RESPIRATION RATE: 14 BRPM | BODY MASS INDEX: 21.9 KG/M2 | SYSTOLIC BLOOD PRESSURE: 148 MMHG | OXYGEN SATURATION: 99 % | TEMPERATURE: 96.8 F | WEIGHT: 119 LBS | DIASTOLIC BLOOD PRESSURE: 72 MMHG

## 2021-11-11 DIAGNOSIS — Z23 ENCOUNTER FOR IMMUNIZATION: ICD-10-CM

## 2021-11-11 DIAGNOSIS — Z00.00 ENCOUNTER FOR GENERAL ADULT MEDICAL EXAMINATION W/OUT ABNORMAL FINDINGS: ICD-10-CM

## 2021-11-11 PROCEDURE — G0439: CPT

## 2021-11-11 PROCEDURE — G0008: CPT

## 2021-11-11 PROCEDURE — 93790 AMBL BP MNTR W/SW I&R: CPT

## 2021-11-11 PROCEDURE — 90662 IIV NO PRSV INCREASED AG IM: CPT

## 2021-11-11 NOTE — HISTORY OF PRESENT ILLNESS
[de-identified] : Had Pfizer x 3. \par \par Pt fell in her yard and fractured some ribs when she fell into a planter. He also has bruising of her left knee and saw Dr Xiong. \par \par Takes tums once or twice a day. \par \par Her BP was elevated when she saw Dr Painting. She is going in today for 24 hour ambulatory BP.

## 2021-11-11 NOTE — HEALTH RISK ASSESSMENT
[] : No [No] : In the past 12 months have you used drugs other than those required for medical reasons? No [No falls in past year] : Patient reported no falls in the past year [0] : 2) Feeling down, depressed, or hopeless: Not at all (0) [PHQ-2 Negative - No further assessment needed] : PHQ-2 Negative - No further assessment needed

## 2021-11-11 NOTE — PHYSICAL EXAM
[Normal Appearance] : normal in appearance [No Masses] : no palpable masses [de-identified] : left knee bruising

## 2021-11-12 LAB
25(OH)D3 SERPL-MCNC: 37.8 NG/ML
ALBUMIN SERPL ELPH-MCNC: 4.8 G/DL
ALP BLD-CCNC: 118 U/L
ALT SERPL-CCNC: 26 U/L
ANION GAP SERPL CALC-SCNC: 14 MMOL/L
APPEARANCE: CLEAR
AST SERPL-CCNC: 23 U/L
BACTERIA: NEGATIVE
BASOPHILS # BLD AUTO: 0.05 K/UL
BASOPHILS NFR BLD AUTO: 0.4 %
BILIRUB SERPL-MCNC: 1.1 MG/DL
BILIRUBIN URINE: NEGATIVE
BLOOD URINE: ABNORMAL
BUN SERPL-MCNC: 18 MG/DL
CALCIUM SERPL-MCNC: 10.1 MG/DL
CHLORIDE SERPL-SCNC: 100 MMOL/L
CHOLEST SERPL-MCNC: 227 MG/DL
CO2 SERPL-SCNC: 25 MMOL/L
COLOR: NORMAL
CREAT SERPL-MCNC: 0.67 MG/DL
EOSINOPHIL # BLD AUTO: 0.35 K/UL
EOSINOPHIL NFR BLD AUTO: 2.9 %
ESTIMATED AVERAGE GLUCOSE: 114 MG/DL
FOLATE SERPL-MCNC: >20 NG/ML
GLUCOSE QUALITATIVE U: NEGATIVE
GLUCOSE SERPL-MCNC: 98 MG/DL
HBA1C MFR BLD HPLC: 5.6 %
HCT VFR BLD CALC: 45 %
HDLC SERPL-MCNC: 98 MG/DL
HGB BLD-MCNC: 14.1 G/DL
HYALINE CASTS: 1 /LPF
IMM GRANULOCYTES NFR BLD AUTO: 0.3 %
KETONES URINE: NEGATIVE
LDLC SERPL CALC-MCNC: 119 MG/DL
LEUKOCYTE ESTERASE URINE: NEGATIVE
LYMPHOCYTES # BLD AUTO: 2.71 K/UL
LYMPHOCYTES NFR BLD AUTO: 22.8 %
MAN DIFF?: NORMAL
MCHC RBC-ENTMCNC: 29.6 PG
MCHC RBC-ENTMCNC: 31.3 GM/DL
MCV RBC AUTO: 94.3 FL
MICROSCOPIC-UA: NORMAL
MONOCYTES # BLD AUTO: 0.63 K/UL
MONOCYTES NFR BLD AUTO: 5.3 %
NEUTROPHILS # BLD AUTO: 8.14 K/UL
NEUTROPHILS NFR BLD AUTO: 68.3 %
NITRITE URINE: NEGATIVE
NONHDLC SERPL-MCNC: 129 MG/DL
PH URINE: 6.5
PLATELET # BLD AUTO: 263 K/UL
POTASSIUM SERPL-SCNC: 4 MMOL/L
PROT SERPL-MCNC: 7.5 G/DL
PROTEIN URINE: NEGATIVE
RBC # BLD: 4.77 M/UL
RBC # FLD: 12.7 %
RED BLOOD CELLS URINE: 26 /HPF
SODIUM SERPL-SCNC: 139 MMOL/L
SPECIFIC GRAVITY URINE: 1.01
SQUAMOUS EPITHELIAL CELLS: 0 /HPF
TRIGL SERPL-MCNC: 49 MG/DL
TSH SERPL-ACNC: 1.52 UIU/ML
URATE SERPL-MCNC: 2.9 MG/DL
UROBILINOGEN URINE: NORMAL
VIT B12 SERPL-MCNC: 1073 PG/ML
WBC # FLD AUTO: 11.91 K/UL
WHITE BLOOD CELLS URINE: 1 /HPF

## 2021-11-19 ENCOUNTER — APPOINTMENT (OUTPATIENT)
Dept: UROLOGY | Facility: CLINIC | Age: 81
End: 2021-11-19
Payer: MEDICARE

## 2021-11-19 PROCEDURE — 99214 OFFICE O/P EST MOD 30 MIN: CPT

## 2021-11-23 LAB
APPEARANCE: CLEAR
BACTERIA UR CULT: NORMAL
BACTERIA: NEGATIVE
BILIRUBIN URINE: NEGATIVE
BLOOD URINE: NEGATIVE
COLOR: COLORLESS
GLUCOSE QUALITATIVE U: NEGATIVE
HYALINE CASTS: 0 /LPF
KETONES URINE: NEGATIVE
LEUKOCYTE ESTERASE URINE: NEGATIVE
MICROSCOPIC-UA: NORMAL
NITRITE URINE: NEGATIVE
PH URINE: 7.5
PROTEIN URINE: NEGATIVE
RED BLOOD CELLS URINE: 0 /HPF
SPECIFIC GRAVITY URINE: 1.01
SQUAMOUS EPITHELIAL CELLS: 0 /HPF
UROBILINOGEN URINE: NORMAL
WHITE BLOOD CELLS URINE: 0 /HPF

## 2021-11-26 LAB — URINE CYTOLOGY: NORMAL

## 2021-11-28 NOTE — ASSESSMENT
[FreeTextEntry1] : Microhematuria:\par Discussed the differential diagnosis of hematuria including benign and malignant pathology- including but not limited to nephrolithiasis, bladder stone, urinary tract infection, glomerular disease, renal cancer, bladder cancer. \par Also discussed the chance that workup will not reveal a source for the bleeding. The patient understands that the hematuria could be from an upper tract (kidney or ureter) or lower tract (bladder, urethra) and that workup includes imaging and direct visualization of all of these.\par \par Will get Urinalysis, Urine culture and Urine cytology.\par Will get and Bladder Ultrasound. \par \par Return to clinic for next available Cystoscopy.

## 2021-11-28 NOTE — HISTORY OF PRESENT ILLNESS
[FreeTextEntry1] : 82 yo female presents for Microhematuria. \par Recently had urine test and was told has microscopic hematuria. \par No gross hematuria. No history of recurrent urinary tract infections or kidney stones. \par Daytime frequency is every 2-4 hours or so. Nocturia of 2-3 x. \par Denies dysuria, hematuria, lower abdominal or flank pain, nausea, vomiting, fever, chills or rigors. \par Non smoker. \par Had CT Urogram in Aug 2021 for further evaluation for hydronephrosis and Renal cyst.  \par On CT scan no kidney mass, stone, hydronephrosis or concerning lesion on CT scan except 1 cm left renal cyst. \par \par Initially seen for Renal cyst and Hydronephrosis. \par Had been having off and on suprapubic pain, 6/10. \par Had Renal and Bladder Ultrasound, found to have renal cyst and hydronephrosis. \par No history of kidney stone. \par Daytime frequency is every 2-4 hours or so. Nocturia of 2-3 x. \par Endorsed off and on dysuria. \par Denied hematuria, fever, chills or rigors.\par Non smoker. \par Has been having small hard bowel movements. \par

## 2021-12-03 ENCOUNTER — APPOINTMENT (OUTPATIENT)
Dept: UROLOGY | Facility: CLINIC | Age: 81
End: 2021-12-03
Payer: MEDICARE

## 2021-12-03 VITALS
HEART RATE: 60 BPM | WEIGHT: 119 LBS | DIASTOLIC BLOOD PRESSURE: 58 MMHG | BODY MASS INDEX: 21.9 KG/M2 | HEIGHT: 62 IN | OXYGEN SATURATION: 97 % | SYSTOLIC BLOOD PRESSURE: 165 MMHG

## 2021-12-03 PROCEDURE — 52000 CYSTOURETHROSCOPY: CPT

## 2021-12-03 RX ORDER — ESTRADIOL 0.1 MG/G
0.1 CREAM VAGINAL
Qty: 1 | Refills: 1 | Status: ACTIVE | COMMUNITY
Start: 2021-12-03 | End: 1900-01-01

## 2021-12-09 ENCOUNTER — RX RENEWAL (OUTPATIENT)
Age: 81
End: 2021-12-09

## 2022-04-28 ENCOUNTER — APPOINTMENT (OUTPATIENT)
Dept: UROLOGY | Facility: CLINIC | Age: 82
End: 2022-04-28
Payer: MEDICARE

## 2022-04-28 VITALS — DIASTOLIC BLOOD PRESSURE: 66 MMHG | OXYGEN SATURATION: 98 % | SYSTOLIC BLOOD PRESSURE: 147 MMHG | HEART RATE: 69 BPM

## 2022-04-28 DIAGNOSIS — N95.2 POSTMENOPAUSAL ATROPHIC VAGINITIS: ICD-10-CM

## 2022-04-28 DIAGNOSIS — N36.2 URETHRAL CARUNCLE: ICD-10-CM

## 2022-04-28 PROCEDURE — 99213 OFFICE O/P EST LOW 20 MIN: CPT

## 2022-04-28 NOTE — HISTORY OF PRESENT ILLNESS
[FreeTextEntry1] : 81 yo female presents for follow up. \par Did not use Estrogen cream, concerned about side effects. \par Reasonable stream. Daytime frequency is every 2-4 hours or so. Nocturia of 2-3 x. No urinary incontinence. \par Denies dysuria, hematuria, lower abdominal or flank pain, nausea, vomiting, fever, chills or rigors. \par Has Back pain, gets Epidural injections. \par \par Work up for Microhematuria including Cystoscopy(12/3/2021) negative except urethral caruncle on Pelvic exam. \par Prescribed Estrogen cream.\par \par Seen in November 2021 for Microhematuria. \par Recently had urine test and was told has microscopic hematuria. \par No gross hematuria. No history of recurrent urinary tract infections or kidney stones. \par Daytime frequency is every 2-4 hours or so. Nocturia of 2-3 x. \par Denies dysuria, hematuria, lower abdominal or flank pain, nausea, vomiting, fever, chills or rigors. \par Non smoker. \par Had CT Urogram in Aug 2021 for further evaluation for hydronephrosis and Renal cyst.  \par On CT scan no kidney mass, stone, hydronephrosis or concerning lesion on CT scan except 1 cm left renal cyst. \par \par Initially seen for Renal cyst and Hydronephrosis. \par Had been having off and on suprapubic pain, 6/10. \par Had Renal and Bladder Ultrasound, found to have renal cyst and hydronephrosis. \par No history of kidney stone. \par Daytime frequency is every 2-4 hours or so. Nocturia of 2-3 x. \par Endorsed off and on dysuria. \par Denied hematuria, fever, chills or rigors.\par Non smoker. \par Has been having small hard bowel movements. \par

## 2022-04-28 NOTE — ASSESSMENT
[FreeTextEntry1] : Atrophic vaginitis:\par Urethral caruncle:\par Discussed risks and benefits of ow dose vaginal estrogen cream.  \par Hesitant to use Estrogen \par \par Follow with Primary care physician. \par Return to clinic as needed.

## 2022-05-03 ENCOUNTER — RX RENEWAL (OUTPATIENT)
Age: 82
End: 2022-05-03

## 2022-05-05 ENCOUNTER — NON-APPOINTMENT (OUTPATIENT)
Age: 82
End: 2022-05-05

## 2022-05-09 ENCOUNTER — RX RENEWAL (OUTPATIENT)
Age: 82
End: 2022-05-09

## 2022-05-19 ENCOUNTER — APPOINTMENT (OUTPATIENT)
Dept: INTERNAL MEDICINE | Facility: CLINIC | Age: 82
End: 2022-05-19
Payer: MEDICARE

## 2022-05-19 VITALS
OXYGEN SATURATION: 97 % | RESPIRATION RATE: 16 BRPM | HEART RATE: 134 BPM | WEIGHT: 125 LBS | TEMPERATURE: 96.3 F | DIASTOLIC BLOOD PRESSURE: 70 MMHG | SYSTOLIC BLOOD PRESSURE: 124 MMHG | BODY MASS INDEX: 23 KG/M2 | HEIGHT: 62 IN

## 2022-05-19 PROCEDURE — 99214 OFFICE O/P EST MOD 30 MIN: CPT

## 2022-05-19 NOTE — HEALTH RISK ASSESSMENT
[0] : 2) Feeling down, depressed, or hopeless: Not at all (0) [PHQ-2 Negative - No further assessment needed] : PHQ-2 Negative - No further assessment needed [[id number]] : 0

## 2022-05-19 NOTE — PHYSICAL EXAM
[Normal] : affect was normal and insight and judgment were intact [de-identified] : irreg, irreg [de-identified] : trace ankle edema

## 2022-05-19 NOTE — HISTORY OF PRESENT ILLNESS
[FreeTextEntry1] : BP check [de-identified] : Here for 6 months follow up. She saw Dr Whitmore from  Spine. Got trigger shots in back and neck. She sees a neurologist for epilepsy which is under control. \par She was in [place] for the winter. \par Has osteoporosis and did not tolerate fosamax. Doesn't want to take anything. Does take calcium.

## 2022-05-20 LAB
ALBUMIN SERPL ELPH-MCNC: 4.5 G/DL
ALP BLD-CCNC: 112 U/L
ALT SERPL-CCNC: 32 U/L
ANION GAP SERPL CALC-SCNC: 12 MMOL/L
AST SERPL-CCNC: 27 U/L
BASOPHILS # BLD AUTO: 0.05 K/UL
BASOPHILS NFR BLD AUTO: 0.5 %
BILIRUB SERPL-MCNC: 0.9 MG/DL
BUN SERPL-MCNC: 18 MG/DL
CALCIUM SERPL-MCNC: 10.1 MG/DL
CHLORIDE SERPL-SCNC: 105 MMOL/L
CHOLEST SERPL-MCNC: 155 MG/DL
CO2 SERPL-SCNC: 25 MMOL/L
CREAT SERPL-MCNC: 0.73 MG/DL
EGFR: 82 ML/MIN/1.73M2
EOSINOPHIL # BLD AUTO: 0.37 K/UL
EOSINOPHIL NFR BLD AUTO: 3.5 %
ESTIMATED AVERAGE GLUCOSE: 117 MG/DL
GLUCOSE SERPL-MCNC: 92 MG/DL
HBA1C MFR BLD HPLC: 5.7 %
HCT VFR BLD CALC: 41.4 %
HDLC SERPL-MCNC: 89 MG/DL
HGB BLD-MCNC: 12.8 G/DL
IMM GRANULOCYTES NFR BLD AUTO: 0.2 %
LDLC SERPL CALC-MCNC: 58 MG/DL
LYMPHOCYTES # BLD AUTO: 2.45 K/UL
LYMPHOCYTES NFR BLD AUTO: 23.1 %
MAN DIFF?: NORMAL
MCHC RBC-ENTMCNC: 29.4 PG
MCHC RBC-ENTMCNC: 30.9 GM/DL
MCV RBC AUTO: 95 FL
MONOCYTES # BLD AUTO: 0.77 K/UL
MONOCYTES NFR BLD AUTO: 7.3 %
NEUTROPHILS # BLD AUTO: 6.96 K/UL
NEUTROPHILS NFR BLD AUTO: 65.4 %
NONHDLC SERPL-MCNC: 66 MG/DL
PLATELET # BLD AUTO: 228 K/UL
POTASSIUM SERPL-SCNC: 4.3 MMOL/L
PROT SERPL-MCNC: 7.1 G/DL
RBC # BLD: 4.36 M/UL
RBC # FLD: 12.7 %
SODIUM SERPL-SCNC: 141 MMOL/L
TRIGL SERPL-MCNC: 39 MG/DL
WBC # FLD AUTO: 10.62 K/UL

## 2022-05-25 ENCOUNTER — NON-APPOINTMENT (OUTPATIENT)
Age: 82
End: 2022-05-25

## 2022-05-25 ENCOUNTER — APPOINTMENT (OUTPATIENT)
Dept: CARDIOLOGY | Facility: CLINIC | Age: 82
End: 2022-05-25
Payer: MEDICARE

## 2022-05-25 VITALS
BODY MASS INDEX: 23.55 KG/M2 | WEIGHT: 128 LBS | DIASTOLIC BLOOD PRESSURE: 56 MMHG | SYSTOLIC BLOOD PRESSURE: 165 MMHG | OXYGEN SATURATION: 98 % | HEIGHT: 62 IN | HEART RATE: 61 BPM

## 2022-05-25 DIAGNOSIS — I49.1 ATRIAL PREMATURE DEPOLARIZATION: ICD-10-CM

## 2022-05-25 PROCEDURE — 93000 ELECTROCARDIOGRAM COMPLETE: CPT

## 2022-05-25 PROCEDURE — 99215 OFFICE O/P EST HI 40 MIN: CPT

## 2022-06-01 ENCOUNTER — APPOINTMENT (OUTPATIENT)
Dept: CARDIOLOGY | Facility: CLINIC | Age: 82
End: 2022-06-01
Payer: MEDICARE

## 2022-06-01 ENCOUNTER — RX RENEWAL (OUTPATIENT)
Age: 82
End: 2022-06-01

## 2022-06-01 ENCOUNTER — APPOINTMENT (OUTPATIENT)
Dept: ORTHOPEDIC SURGERY | Facility: CLINIC | Age: 82
End: 2022-06-01
Payer: MEDICARE

## 2022-06-01 VITALS — BODY MASS INDEX: 23.55 KG/M2 | WEIGHT: 128 LBS | HEIGHT: 62 IN

## 2022-06-01 DIAGNOSIS — M25.571 PAIN IN RIGHT ANKLE AND JOINTS OF RIGHT FOOT: ICD-10-CM

## 2022-06-01 DIAGNOSIS — M65.28 CALCIFIC TENDINITIS, OTHER SITE: ICD-10-CM

## 2022-06-01 PROCEDURE — 93880 EXTRACRANIAL BILAT STUDY: CPT

## 2022-06-01 PROCEDURE — 73610 X-RAY EXAM OF ANKLE: CPT | Mod: RT

## 2022-06-01 PROCEDURE — 93306 TTE W/DOPPLER COMPLETE: CPT

## 2022-06-01 PROCEDURE — 99204 OFFICE O/P NEW MOD 45 MIN: CPT

## 2022-06-01 PROCEDURE — 73620 X-RAY EXAM OF FOOT: CPT | Mod: RT

## 2022-06-01 PROCEDURE — 99203 OFFICE O/P NEW LOW 30 MIN: CPT | Mod: 25

## 2022-06-02 NOTE — PHYSICAL EXAM
[5___] : eversion 5[unfilled]/5 [Right] : right ankle [Weight -] : weightbearing [] : negative Marc test [FreeTextEntry3] : diffuse leg varicosities [FreeTextEntry9] : silouhette soft tissue swelling, calcific density achilles

## 2022-06-02 NOTE — DISCUSSION/SUMMARY
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

## 2022-06-02 NOTE — HISTORY OF PRESENT ILLNESS
[de-identified] : 6/1/22: 82F c/o right ankle pain. Gradual onset of pain and swelling that is intermittent for the past few weeks. Pain is mostly back of her ankle. No mechanism of injury. No prior injuries. Just received an echo and carotid artery testing to r/o heart issue; waiting on results. No prior intervention. She walks every day.  \par \par Hx Afib ***on ELOQUIS*** she has had issues with  Eloquis in the past -> hematuria, HTN, C-DDD, L-DDD followed by pain mgmt, Epilepsy.

## 2022-06-21 ENCOUNTER — RX RENEWAL (OUTPATIENT)
Age: 82
End: 2022-06-21

## 2022-07-25 ENCOUNTER — APPOINTMENT (OUTPATIENT)
Dept: ORTHOPEDIC SURGERY | Facility: CLINIC | Age: 82
End: 2022-07-25

## 2022-08-22 ENCOUNTER — APPOINTMENT (OUTPATIENT)
Dept: ORTHOPEDIC SURGERY | Facility: CLINIC | Age: 82
End: 2022-08-22

## 2022-08-22 VITALS — HEIGHT: 62 IN | BODY MASS INDEX: 23.55 KG/M2 | WEIGHT: 128 LBS

## 2022-08-22 PROCEDURE — 72170 X-RAY EXAM OF PELVIS: CPT

## 2022-08-22 PROCEDURE — 72110 X-RAY EXAM L-2 SPINE 4/>VWS: CPT

## 2022-08-22 PROCEDURE — 99214 OFFICE O/P EST MOD 30 MIN: CPT

## 2022-08-22 PROCEDURE — 72070 X-RAY EXAM THORAC SPINE 2VWS: CPT

## 2022-08-22 PROCEDURE — 99204 OFFICE O/P NEW MOD 45 MIN: CPT

## 2022-08-22 NOTE — HISTORY OF PRESENT ILLNESS
[Lower back] : lower back [7] : 7 [6] : 6 [Sharp] : sharp [Retired] : Work status: retired [de-identified] : 8/22/22: 81 yo LHD f here for the evaluation of her low back; Pain ongoing for years without injury. Pain worse as the days goes on, there is difficulty standing and walking. She swims everyday. There is no leg pain. No n/t.  No loss fo b/b control. She takes tylenol  at night\par \par Was following pain management (Dr Gayle) where LESI given in 8/2021 without relief\par Had TPIs in 4/2022\par Left L5-S1 ablation (Dr Jamey Mtz)\par \par Takes Eliquis for atrail fibrillation \par Hx hypertension and hyperlipdiemia \par \par Has epilepsy - takes lamitrigine (See Dr Sykes twice)\par \par Hx cholecystectomy\par \par No hx diabetes/cancer\par \par MRI L-spine 5/7/21\par Extensive multilevel lumbar spondylosis \par \par MRI T-spine 9/10/19\par Thoracic kyphosis, dextroscoliosis\par lung nodule 1.2 cm right upper lung - had had this monitored prior to 2020\par \par xrays todays \par L spine - scoliosis 36 \par ap pelvis - negative \par T spine - scoliosis, compression deformity  [] : no [FreeTextEntry5] : LOWER BACK - FELL LAST OCTOBER AND FX'D RIBS AND IS HAVING BACK PAIN

## 2022-08-22 NOTE — DISCUSSION/SUMMARY
[de-identified] : rec she f/u the lung and kidney findings with the PCP\par indicated for MRi of the T and L spine assess for fractures\par fu the mris

## 2022-08-29 ENCOUNTER — FORM ENCOUNTER (OUTPATIENT)
Age: 82
End: 2022-08-29

## 2022-08-30 ENCOUNTER — APPOINTMENT (OUTPATIENT)
Dept: MRI IMAGING | Facility: CLINIC | Age: 82
End: 2022-08-30

## 2022-08-30 PROCEDURE — 72148 MRI LUMBAR SPINE W/O DYE: CPT

## 2022-08-30 PROCEDURE — 72146 MRI CHEST SPINE W/O DYE: CPT

## 2022-09-04 ENCOUNTER — RX RENEWAL (OUTPATIENT)
Age: 82
End: 2022-09-04

## 2022-09-06 ENCOUNTER — RX RENEWAL (OUTPATIENT)
Age: 82
End: 2022-09-06

## 2022-09-26 ENCOUNTER — APPOINTMENT (OUTPATIENT)
Dept: ORTHOPEDIC SURGERY | Facility: CLINIC | Age: 82
End: 2022-09-26

## 2022-09-26 DIAGNOSIS — M47.814 SPONDYLOSIS W/OUT MYELOPATHY OR RADICULOPATHY, THORACIC REGION: ICD-10-CM

## 2022-09-26 DIAGNOSIS — M51.24 OTHER INTERVERTEBRAL DISC DISPLACEMENT, THORACIC REGION: ICD-10-CM

## 2022-09-26 PROCEDURE — 99214 OFFICE O/P EST MOD 30 MIN: CPT

## 2022-09-26 NOTE — DATA REVIEWED
[MRI] : MRI [Thoracic Spine] : thoracic spine [Report was reviewed and noted in the chart] : The report was reviewed and noted in the chart [I independently reviewed and interpreted images and report] : I independently reviewed and interpreted images and report

## 2022-09-26 NOTE — HISTORY OF PRESENT ILLNESS
[Gradual] : gradual [9] : 9 [0] : 0 [Intermittent] : intermittent [Retired] : Work status: retired [de-identified] : 8/22/22: 81 yo LHD f here for the evaluation of her low back; Pain ongoing for years without injury. Pain worse as the days goes on, there is difficulty standing and walking. She swims everyday. There is no leg pain. No n/t.  No loss fo b/b control. She takes tylenol  at night\par \par Was following pain management (Dr Gayle) where LESI given in 8/2021 without relief\par Had TPIs in 4/2022\par Left L5-S1 ablation (Dr Jamey Mtz)\par \par Takes Eliquis for atrail fibrillation \par Hx hypertension and hyperlipdiemia \par \par Has epilepsy - takes lamitrigine (See Dr Sykes twice)\par \par Hx cholecystectomy\par \par No hx diabetes/cancer\par \par MRI L-spine 5/7/21\par Extensive multilevel lumbar spondylosis \par \par MRI T-spine 9/10/19\par Thoracic kyphosis, dextroscoliosis\par lung nodule 1.2 cm right upper lung - had had this monitored prior to 2020\par \par xrays todays \par L spine - scoliosis 36 \par ap pelvis - negative \par T spine - scoliosis, compression deformity \par \par 9/26/22: Here for MRI review - plan at last was "rec she f/u the lung and kidney findings with the PCP\par indicated for MRi of the T and L spine assess for fractures\par fu the mris" - overall the pain in the back is progressive - more on the feet - gets worse through the day - cant really stand up at the end of the day \par \par MRI Tspine:\par Levoscoliosis.\par Mild-moderate degenerative disc disease and multilevel facet osteoarthrosis throughout the lumbar spine. The \par degenerative disc disease is most pronounced at L2-3 and L5-S1.\par Small bulge at L1-2 and small disc herniations from L2-3 through L5-S1 without stenosis or nerve root \par compression.\par Modic type I endplate changes adjacent to the L2-3 and L5-S1 discs.\par Significant atrophy and fat replacement the midline paraspinal muscles of the back.\par Small left renal cyst.\par \par MRI L spine:\par Mild degenerative disc disease throughout the thoracic spine without a significant disc herniation, endplate \par osteophyte, spinal cord compression or nerve root compression.\par No fractures are seen.\par  [] : no [de-identified] : mri

## 2022-09-26 NOTE — REASON FOR VISIT
[FreeTextEntry2] : 09/26/2022 :ORLANDO ROSALES , a 82 year old female, presents today for mri results thoractic,lumbar pain\par

## 2022-09-26 NOTE — DISCUSSION/SUMMARY
[de-identified] : reviewed the MRis with patient \par no surgical findings\par diffuse spondylosis - no clear surgical target \par not a good candidate for a scoliosis correnction \par would rec she consider a SCS

## 2022-10-06 ENCOUNTER — APPOINTMENT (OUTPATIENT)
Dept: PAIN MANAGEMENT | Facility: CLINIC | Age: 82
End: 2022-10-06

## 2022-10-06 VITALS — BODY MASS INDEX: 23.55 KG/M2 | HEIGHT: 62 IN | WEIGHT: 128 LBS

## 2022-10-06 PROCEDURE — 99204 OFFICE O/P NEW MOD 45 MIN: CPT

## 2022-10-06 NOTE — PHYSICAL EXAM
[Flexion] : flexion [Extension] : extension [de-identified] : Constitutional; Appears well, no apparent distress\par Ability to communicate: Normal \par Respiratory: non-labored breathing\par Skin: No rash noted\par Head: Normocephalic, atraumatic\par Neck: no visible thyroid enlargement\par Eyes: Extraocular movements intact\par Neurologic: Alert and oriented x3\par Psychiatric: normal mood, affect and behavior \par \par  [] : non-antalgic

## 2022-10-06 NOTE — DISCUSSION/SUMMARY
[de-identified] : After discussing various treatment options with the patient including but not limited to oral medications, physical therapy, exercise modalities as well as interventional spinal injections, we have decided with the following plan:\par \par - Continue Home exercises, stretching, activity modification, physical therapy, and conservative care.\par - MRI report and/or images was reviewed and discussed with the patient.\par - Recommend First Diagnostic Bilateral L3,L4,L5 Medial Branch Blocks under fluoroscopic guidance with image.\par - Patient presents with axial lumbar pain that has not responded to 3 months of conservative therapy including physical therapy or NSAID therapy.  The pain is interfering with activities of daily living and functionality. There is no radicular pain. The pain is exacerbated by facet loading / positive Kemps maneuver which is defined by pain reproduction with extension and rotation of the lumbar spine to the affected side.  The patient has not had a vertebral fusion at the levels of the proposed treatment.  There is no unexplained neurologic deficit.  There is no history of systemic infection, unstable medical condition, bleeding tendency, or local infection.  The injection is being performed to diagnose the facet joint as the source of the individual's pain, in preparation for a radiofrequency ablation. \par - The risks, benefits, contents and alternatives to injection were explained in full to the patient.  Risks outlined include but are not limited to infection, sepsis, bleeding, post-dural puncture headache, nerve damage, temporary increase in pain, syncopal episode, failure to resolve symptoms, allergic reaction, symptom recurrence, and elevation of blood sugar in diabetics. Cortisone may cause immunosuppression.  Patient understands the risks.  All questions were answered.  After discussion of options, patient requested an injection.  Information regarding the injection was given to the patient.  Which medications to stop prior to the injection was explained to the patient as well.\par - Follow up in 1-2 weeks post injection for re-evaluation.\par - Patient is on anticoagulation therapy and needs medical clearance to stop medication for the procedure (ELIQUIS) \par \par \par

## 2022-10-06 NOTE — HISTORY OF PRESENT ILLNESS
[Lower back] : lower back [8] : 8 [9] : 9 [Radiating] : radiating [Sharp] : sharp [Sleep] : sleep [Rest] : rest [Walking] : walking [Bending forward] : bending forward [Retired] : Work status: retired [] : no [FreeTextEntry1] : hips [FreeTextEntry7] : hip

## 2022-10-11 ENCOUNTER — NON-APPOINTMENT (OUTPATIENT)
Age: 82
End: 2022-10-11

## 2022-10-12 ENCOUNTER — NON-APPOINTMENT (OUTPATIENT)
Age: 82
End: 2022-10-12

## 2022-10-13 ENCOUNTER — APPOINTMENT (OUTPATIENT)
Dept: PAIN MANAGEMENT | Facility: CLINIC | Age: 82
End: 2022-10-13

## 2022-11-01 ENCOUNTER — APPOINTMENT (OUTPATIENT)
Dept: OPHTHALMOLOGY | Facility: CLINIC | Age: 82
End: 2022-11-01

## 2022-11-01 ENCOUNTER — NON-APPOINTMENT (OUTPATIENT)
Age: 82
End: 2022-11-01

## 2022-11-01 PROCEDURE — 92014 COMPRE OPH EXAM EST PT 1/>: CPT

## 2022-11-03 ENCOUNTER — APPOINTMENT (OUTPATIENT)
Dept: PAIN MANAGEMENT | Facility: CLINIC | Age: 82
End: 2022-11-03
Payer: MEDICARE

## 2022-11-03 PROCEDURE — 64493 INJ PARAVERT F JNT L/S 1 LEV: CPT | Mod: 50

## 2022-11-03 PROCEDURE — J3490M: CUSTOM

## 2022-11-03 PROCEDURE — 64494 INJ PARAVERT F JNT L/S 2 LEV: CPT | Mod: 50

## 2022-11-03 NOTE — PROCEDURE
[FreeTextEntry3] : Date of Service: 11/03/2022 \par \par Account: [id number]\par \par Patient: ORLANDO ROSALES \par \par YOB: 1940\par \par Age: 82 year\par \par \par Surgeon:                                                        Shaji Perrin D.O. \par \par Assistant:                                                        None\par \par Pre-Operative Diagnosis:                            Spondylosis of lumbar region without myelopathy or radiculopathy (M47.816)\par \par Post-Operative Diagnosis:                          Same\par \par Procedure:                                                     Right L3, L4, L5 medial branch block \par                                                                          Left L3, L4, L5 medial branch block under fluoroscopic guidance\par \par Anesthesia:                                                     Local with MAC\par \par \par This procedure was carried out using fluoroscopic guidance.  The risks and benefits of the procedure were discussed extensively with the patient.  The consent of the patient was obtained and the following procedure was performed.\par \par The patient was placed in the prone position.  The patient's back was prepped and draped in a sterile fashion. A timeout was performed with all essential staff present and the site and side were verified. The L4 and L5 lumbar vertebral bodies were identified and the fluoroscope left obliqued to approximately 30 degrees to reveal good "carolyn-dog" anatomical view.  The junction of the superior articulate process and transverse process at the L4 and L5 and sacral ala level was then identified and marked. The skin at these target points was then localized using 1 cc of 1% lidocaine without epinephrine at each injection site.  A spinal needle was then introduced and advanced to the above target points at the junction of the SAP and transverse processes and sacral ala until bone was contacted.  After negative aspiration for heme and CSF, an injectate of 1.5 cc 0.25% Bupivacaine plus 1 mg of betamethasone was injected at each of the three levels. \par \par The L4 and L5 lumbar vertebral bodies were identified and the fluoroscope right obliqued to approximately 30 degrees to reveal good "carolyn-dog" anatomical view.  The junction of the superior articulate process and transverse process at the L4 and L5 and sacral ala level was then identified and marked. The skin at these target points was then localized using 1 cc of 1% lidocaine without epinephrine at each injection site.  A spinal needle was then introduced and advanced to the above target points at the junction of the SAP and transverse processes and sacral ala until bone was contacted.  After negative aspiration for heme and CSF, an injectate of 1.5 cc 0.25% bupivacaine plus 1 mg of betamethasone was injected at each of the three levels. \par \par The needles were then removed and pressure was applied.  Anesthesia personnel were present throughout the procedure, monitoring vitals which were stable throughout.\par \par Shaji Perrin D.O.\par

## 2022-11-17 ENCOUNTER — APPOINTMENT (OUTPATIENT)
Dept: PAIN MANAGEMENT | Facility: CLINIC | Age: 82
End: 2022-11-17

## 2022-11-17 ENCOUNTER — APPOINTMENT (OUTPATIENT)
Dept: INTERNAL MEDICINE | Facility: CLINIC | Age: 82
End: 2022-11-17

## 2022-11-17 VITALS
SYSTOLIC BLOOD PRESSURE: 116 MMHG | DIASTOLIC BLOOD PRESSURE: 74 MMHG | BODY MASS INDEX: 23.19 KG/M2 | TEMPERATURE: 97.2 F | WEIGHT: 126 LBS | OXYGEN SATURATION: 98 % | RESPIRATION RATE: 14 BRPM | HEIGHT: 62 IN | HEART RATE: 68 BPM

## 2022-11-17 VITALS — BODY MASS INDEX: 23.55 KG/M2 | HEIGHT: 62 IN | WEIGHT: 128 LBS

## 2022-11-17 DIAGNOSIS — J45.909 UNSPECIFIED ASTHMA, UNCOMPLICATED: ICD-10-CM

## 2022-11-17 DIAGNOSIS — R31.29 OTHER MICROSCOPIC HEMATURIA: ICD-10-CM

## 2022-11-17 DIAGNOSIS — M54.50 LOW BACK PAIN, UNSPECIFIED: ICD-10-CM

## 2022-11-17 PROCEDURE — 99214 OFFICE O/P EST MOD 30 MIN: CPT

## 2022-11-17 PROCEDURE — 99213 OFFICE O/P EST LOW 20 MIN: CPT

## 2022-11-17 NOTE — HISTORY OF PRESENT ILLNESS
[FreeTextEntry1] : back pain and BP check  [de-identified] : Pt had injections in her back 2 weeks ago and has some relief.

## 2022-11-17 NOTE — PHYSICAL EXAM
[Flexion] : flexion [Extension] : extension [de-identified] : Constitutional; Appears well, no apparent distress\par Ability to communicate: Normal \par Respiratory: non-labored breathing\par Skin: No rash noted\par Head: Normocephalic, atraumatic\par Neck: no visible thyroid enlargement\par Eyes: Extraocular movements intact\par Neurologic: Alert and oriented x3\par Psychiatric: normal mood, affect and behavior \par \par  [] : non-antalgic

## 2022-11-17 NOTE — HISTORY OF PRESENT ILLNESS
[Lower back] : lower back [Radiating] : radiating [Rest] : rest [Walking] : walking [Bending forward] : bending forward [Retired] : Work status: retired [6] : 6 [2] : 2 [Dull/Aching] : dull/aching [Intermittent] : intermittent [Meds] : meds [Injection therapy] : injection therapy [Standing] : standing [] : no [FreeTextEntry1] : hips [FreeTextEntry9] : Tylenol

## 2022-11-17 NOTE — DISCUSSION/SUMMARY
[de-identified] : After discussing various treatment options with the patient including but not limited to oral medications, physical therapy, exercise modalities as well as interventional spinal injections, we have decided with the following plan:\par \par - Continue Home exercises, stretching, activity modification, physical therapy, and conservative care.\par - MRI report and/or images was reviewed and discussed with the patient.\par - Recommend Second Diagnostic Bilateral L3,L4,L5 Medial Branch Blocks under fluoroscopic guidance with image. First diagnostic MBBs resulted in >80% relief and improvement of ADLs for the duration of the local anesthetic \par - Patient presents with axial lumbar pain that has not responded to 3 months of conservative therapy including physical therapy or NSAID therapy.  The pain is interfering with activities of daily living and functionality. There is no radicular pain. The pain is exacerbated by facet loading / positive Kemps maneuver which is defined by pain reproduction with extension and rotation of the lumbar spine to the affected side.  The patient has not had a vertebral fusion at the levels of the proposed treatment.  There is no unexplained neurologic deficit.  There is no history of systemic infection, unstable medical condition, bleeding tendency, or local infection.  The injection is being performed to diagnose the facet joint as the source of the individual's pain, in preparation for a radiofrequency ablation. \par - The risks, benefits, contents and alternatives to injection were explained in full to the patient.  Risks outlined include but are not limited to infection, sepsis, bleeding, post-dural puncture headache, nerve damage, temporary increase in pain, syncopal episode, failure to resolve symptoms, allergic reaction, symptom recurrence, and elevation of blood sugar in diabetics. Cortisone may cause immunosuppression.  Patient understands the risks.  All questions were answered.  After discussion of options, patient requested an injection.  Information regarding the injection was given to the patient.  Which medications to stop prior to the injection was explained to the patient as well.\par - Follow up in 1-2 weeks post injection for re-evaluation.\par - Patient is on anticoagulation therapy and needs medical clearance to stop medication for the procedure (ELIQUIS) \par \par \par

## 2022-11-17 NOTE — PHYSICAL EXAM
[Normal Rate] : normal rate  [Normal] : affect was normal and insight and judgment were intact [de-identified] : irreg, irreg [de-identified] : trace edema

## 2022-11-18 LAB
25(OH)D3 SERPL-MCNC: 31.8 NG/ML
ALBUMIN SERPL ELPH-MCNC: 4.4 G/DL
ALP BLD-CCNC: 117 U/L
ALT SERPL-CCNC: 28 U/L
ANION GAP SERPL CALC-SCNC: 11 MMOL/L
AST SERPL-CCNC: 25 U/L
BASOPHILS # BLD AUTO: 0.05 K/UL
BASOPHILS NFR BLD AUTO: 0.5 %
BILIRUB SERPL-MCNC: 0.8 MG/DL
BUN SERPL-MCNC: 18 MG/DL
CALCIUM SERPL-MCNC: 9.8 MG/DL
CHLORIDE SERPL-SCNC: 104 MMOL/L
CHOLEST SERPL-MCNC: 161 MG/DL
CO2 SERPL-SCNC: 26 MMOL/L
CREAT SERPL-MCNC: 0.8 MG/DL
EGFR: 74 ML/MIN/1.73M2
EOSINOPHIL # BLD AUTO: 0.43 K/UL
EOSINOPHIL NFR BLD AUTO: 4.1 %
ESTIMATED AVERAGE GLUCOSE: 128 MG/DL
FOLATE SERPL-MCNC: >20 NG/ML
GLUCOSE SERPL-MCNC: 102 MG/DL
HBA1C MFR BLD HPLC: 6.1 %
HCT VFR BLD CALC: 42.9 %
HDLC SERPL-MCNC: 83 MG/DL
HGB BLD-MCNC: 13.4 G/DL
IMM GRANULOCYTES NFR BLD AUTO: 0.4 %
LDLC SERPL CALC-MCNC: 69 MG/DL
LYMPHOCYTES # BLD AUTO: 1.9 K/UL
LYMPHOCYTES NFR BLD AUTO: 17.9 %
MAN DIFF?: NORMAL
MCHC RBC-ENTMCNC: 29.5 PG
MCHC RBC-ENTMCNC: 31.2 GM/DL
MCV RBC AUTO: 94.5 FL
MONOCYTES # BLD AUTO: 0.65 K/UL
MONOCYTES NFR BLD AUTO: 6.1 %
NEUTROPHILS # BLD AUTO: 7.53 K/UL
NEUTROPHILS NFR BLD AUTO: 71 %
NONHDLC SERPL-MCNC: 78 MG/DL
PLATELET # BLD AUTO: 275 K/UL
POTASSIUM SERPL-SCNC: 4.2 MMOL/L
PROT SERPL-MCNC: 7 G/DL
RBC # BLD: 4.54 M/UL
RBC # FLD: 13.6 %
SODIUM SERPL-SCNC: 141 MMOL/L
TRIGL SERPL-MCNC: 46 MG/DL
TSH SERPL-ACNC: 1.84 UIU/ML
URATE SERPL-MCNC: 2.8 MG/DL
VIT B12 SERPL-MCNC: 1134 PG/ML
WBC # FLD AUTO: 10.6 K/UL

## 2022-11-29 ENCOUNTER — APPOINTMENT (OUTPATIENT)
Dept: CARDIOLOGY | Facility: CLINIC | Age: 82
End: 2022-11-29
Payer: MEDICARE

## 2022-11-29 ENCOUNTER — NON-APPOINTMENT (OUTPATIENT)
Age: 82
End: 2022-11-29

## 2022-11-29 VITALS
HEART RATE: 102 BPM | BODY MASS INDEX: 23 KG/M2 | DIASTOLIC BLOOD PRESSURE: 63 MMHG | SYSTOLIC BLOOD PRESSURE: 115 MMHG | HEIGHT: 62 IN | OXYGEN SATURATION: 97 % | WEIGHT: 125 LBS

## 2022-11-29 PROCEDURE — 99214 OFFICE O/P EST MOD 30 MIN: CPT | Mod: 25

## 2022-11-29 PROCEDURE — 93000 ELECTROCARDIOGRAM COMPLETE: CPT | Mod: 59

## 2022-11-29 PROCEDURE — 93224 XTRNL ECG REC UP TO 48 HRS: CPT

## 2022-11-29 RX ORDER — ASPIRIN ENTERIC COATED TABLETS 81 MG 81 MG/1
81 TABLET, DELAYED RELEASE ORAL
Qty: 90 | Refills: 3 | Status: DISCONTINUED | COMMUNITY
Start: 2021-11-08 | End: 2022-11-29

## 2022-12-08 ENCOUNTER — APPOINTMENT (OUTPATIENT)
Dept: PAIN MANAGEMENT | Facility: CLINIC | Age: 82
End: 2022-12-08
Payer: MEDICARE

## 2022-12-08 DIAGNOSIS — M47.816 SPONDYLOSIS W/OUT MYELOPATHY OR RADICULOPATHY, LUMBAR REGION: ICD-10-CM

## 2022-12-08 PROCEDURE — J3490M: CUSTOM

## 2022-12-08 PROCEDURE — 64493 INJ PARAVERT F JNT L/S 1 LEV: CPT | Mod: 50

## 2022-12-08 PROCEDURE — 64494 INJ PARAVERT F JNT L/S 2 LEV: CPT | Mod: 50

## 2022-12-08 NOTE — PROCEDURE
[FreeTextEntry3] : Date of Service: 12/08/2022 \par \par Account: [id number]\par \par Patient: ORLANDO ROSALES \par \par YOB: 1940\par \par Age: 82 year\par \par \par Surgeon:                                                        Shaji Perrin D.O. \par \par Assistant:                                                        None\par \par Pre-Operative Diagnosis:                            Spondylosis of lumbar region without myelopathy or radiculopathy (M47.816)\par \par Post-Operative Diagnosis:                          Same\par \par Procedure:                                                     Right L3, L4, L5 medial branch block \par                                                                          Left L3, L4, L5 medial branch block under fluoroscopic guidance\par \par Anesthesia:                                                     Local with MAC\par \par \par This procedure was carried out using fluoroscopic guidance.  The risks and benefits of the procedure were discussed extensively with the patient.  The consent of the patient was obtained and the following procedure was performed.\par \par The patient was placed in the prone position.  The patient's back was prepped and draped in a sterile fashion. A timeout was performed with all essential staff present and the site and side were verified. The L4 and L5 lumbar vertebral bodies were identified and the fluoroscope left obliqued to approximately 30 degrees to reveal good "carolyn-dog" anatomical view.  The junction of the superior articulate process and transverse process at the L4 and L5 and sacral ala level was then identified and marked. The skin at these target points was then localized using 1 cc of 1% lidocaine without epinephrine at each injection site.  A spinal needle was then introduced and advanced to the above target points at the junction of the SAP and transverse processes and sacral ala until bone was contacted.  After negative aspiration for heme and CSF, an injectate of 1.5 cc 0.25% Bupivacaine plus 1 mg of betamethasone was injected at each of the three levels. \par \par The L4 and L5 lumbar vertebral bodies were identified and the fluoroscope right obliqued to approximately 30 degrees to reveal good "carolyn-dog" anatomical view.  The junction of the superior articulate process and transverse process at the L4 and L5 and sacral ala level was then identified and marked. The skin at these target points was then localized using 1 cc of 1% lidocaine without epinephrine at each injection site.  A spinal needle was then introduced and advanced to the above target points at the junction of the SAP and transverse processes and sacral ala until bone was contacted.  After negative aspiration for heme and CSF, an injectate of 1.5 cc 0.25% bupivacaine plus 1 mg of betamethasone was injected at each of the three levels. \par \par The needles were then removed and pressure was applied.  Anesthesia personnel were present throughout the procedure, monitoring vitals which were stable throughout.\par \par Shaji Perrin D.O.\par

## 2022-12-19 ENCOUNTER — APPOINTMENT (OUTPATIENT)
Dept: PAIN MANAGEMENT | Facility: CLINIC | Age: 82
End: 2022-12-19

## 2022-12-27 ENCOUNTER — RX RENEWAL (OUTPATIENT)
Age: 82
End: 2022-12-27

## 2022-12-30 ENCOUNTER — RX RENEWAL (OUTPATIENT)
Age: 82
End: 2022-12-30

## 2023-02-19 ENCOUNTER — RX RENEWAL (OUTPATIENT)
Age: 83
End: 2023-02-19

## 2023-02-21 ENCOUNTER — RX RENEWAL (OUTPATIENT)
Age: 83
End: 2023-02-21

## 2023-05-01 ENCOUNTER — APPOINTMENT (OUTPATIENT)
Dept: CARDIOLOGY | Facility: CLINIC | Age: 83
End: 2023-05-01

## 2023-05-04 ENCOUNTER — NON-APPOINTMENT (OUTPATIENT)
Age: 83
End: 2023-05-04

## 2023-05-04 ENCOUNTER — APPOINTMENT (OUTPATIENT)
Dept: OPHTHALMOLOGY | Facility: CLINIC | Age: 83
End: 2023-05-04
Payer: MEDICARE

## 2023-05-04 PROCEDURE — 92012 INTRM OPH EXAM EST PATIENT: CPT

## 2023-05-08 ENCOUNTER — RX RENEWAL (OUTPATIENT)
Age: 83
End: 2023-05-08

## 2023-06-01 ENCOUNTER — APPOINTMENT (OUTPATIENT)
Dept: CARDIOLOGY | Facility: CLINIC | Age: 83
End: 2023-06-01
Payer: MEDICARE

## 2023-06-01 ENCOUNTER — NON-APPOINTMENT (OUTPATIENT)
Age: 83
End: 2023-06-01

## 2023-06-01 VITALS
HEART RATE: 75 BPM | HEIGHT: 62 IN | OXYGEN SATURATION: 100 % | BODY MASS INDEX: 23.55 KG/M2 | DIASTOLIC BLOOD PRESSURE: 77 MMHG | WEIGHT: 128 LBS | SYSTOLIC BLOOD PRESSURE: 124 MMHG

## 2023-06-01 PROCEDURE — 93000 ELECTROCARDIOGRAM COMPLETE: CPT

## 2023-06-01 PROCEDURE — 99215 OFFICE O/P EST HI 40 MIN: CPT | Mod: 25

## 2023-06-12 ENCOUNTER — RX RENEWAL (OUTPATIENT)
Age: 83
End: 2023-06-12

## 2023-06-21 ENCOUNTER — APPOINTMENT (OUTPATIENT)
Dept: CARDIOLOGY | Facility: CLINIC | Age: 83
End: 2023-06-21
Payer: MEDICARE

## 2023-06-21 PROCEDURE — 93306 TTE W/DOPPLER COMPLETE: CPT

## 2023-06-21 PROCEDURE — 93880 EXTRACRANIAL BILAT STUDY: CPT

## 2023-06-22 ENCOUNTER — APPOINTMENT (OUTPATIENT)
Dept: INTERNAL MEDICINE | Facility: CLINIC | Age: 83
End: 2023-06-22
Payer: MEDICARE

## 2023-06-22 VITALS
DIASTOLIC BLOOD PRESSURE: 60 MMHG | OXYGEN SATURATION: 98 % | BODY MASS INDEX: 22.08 KG/M2 | HEIGHT: 62 IN | TEMPERATURE: 98.3 F | RESPIRATION RATE: 14 BRPM | WEIGHT: 120 LBS | SYSTOLIC BLOOD PRESSURE: 134 MMHG | HEART RATE: 83 BPM

## 2023-06-22 DIAGNOSIS — M48.061 SPINAL STENOSIS, LUMBAR REGION WITHOUT NEUROGENIC CLAUDICATION: ICD-10-CM

## 2023-06-22 PROCEDURE — 99214 OFFICE O/P EST MOD 30 MIN: CPT

## 2023-06-22 NOTE — HEALTH RISK ASSESSMENT
[No] : No [0] : 2) Feeling down, depressed, or hopeless: Not at all (0) [PHQ-2 Negative - No further assessment needed] : PHQ-2 Negative - No further assessment needed [Never] : Never [[id number]] : 0

## 2023-06-22 NOTE — PHYSICAL EXAM
[Kyphosis] : kyphosis [Normal] : affect was normal and insight and judgment were intact [de-identified] : pain with movement of her back

## 2023-06-22 NOTE — HISTORY OF PRESENT ILLNESS
[FreeTextEntry1] : BP check  [de-identified] : Pt is here for BP and f/u of other medical conditions. Has trouble walking, back pain. She limps from right hip pain and can't walk too fast. \par \par

## 2023-06-23 LAB
ALBUMIN SERPL ELPH-MCNC: 4.7 G/DL
ALP BLD-CCNC: 125 U/L
ALT SERPL-CCNC: 27 U/L
ANION GAP SERPL CALC-SCNC: 15 MMOL/L
AST SERPL-CCNC: 26 U/L
BILIRUB SERPL-MCNC: 1.3 MG/DL
BUN SERPL-MCNC: 17 MG/DL
CALCIUM SERPL-MCNC: 10.6 MG/DL
CHLORIDE SERPL-SCNC: 103 MMOL/L
CHOLEST SERPL-MCNC: 178 MG/DL
CO2 SERPL-SCNC: 23 MMOL/L
CREAT SERPL-MCNC: 0.69 MG/DL
EGFR: 86 ML/MIN/1.73M2
ESTIMATED AVERAGE GLUCOSE: 123 MG/DL
GLUCOSE SERPL-MCNC: 88 MG/DL
HBA1C MFR BLD HPLC: 5.9 %
HDLC SERPL-MCNC: 99 MG/DL
LDLC SERPL CALC-MCNC: 70 MG/DL
NONHDLC SERPL-MCNC: 79 MG/DL
POTASSIUM SERPL-SCNC: 4.1 MMOL/L
PROT SERPL-MCNC: 7.2 G/DL
SODIUM SERPL-SCNC: 141 MMOL/L
TRIGL SERPL-MCNC: 44 MG/DL
TSH SERPL-ACNC: 1.4 UIU/ML

## 2023-07-14 ENCOUNTER — APPOINTMENT (OUTPATIENT)
Dept: ORTHOPEDIC SURGERY | Facility: CLINIC | Age: 83
End: 2023-07-14

## 2023-07-15 ENCOUNTER — RX RENEWAL (OUTPATIENT)
Age: 83
End: 2023-07-15

## 2023-07-17 ENCOUNTER — RX RENEWAL (OUTPATIENT)
Age: 83
End: 2023-07-17

## 2023-07-18 ENCOUNTER — RX RENEWAL (OUTPATIENT)
Age: 83
End: 2023-07-18

## 2023-08-04 LAB
25(OH)D3 SERPL-MCNC: 32.1 NG/ML
ALBUMIN SERPL ELPH-MCNC: 4.5 G/DL
ALP BLD-CCNC: 121 IU/L
ALP BLD-CCNC: 124 U/L
ALP BONE CFR SERPL: 26 %
ALP INTEST CFR SERPL: 10 %
ALP LIVER CFR SERPL: 64 %
ALT SERPL-CCNC: 39 U/L
ANION GAP SERPL CALC-SCNC: 13 MMOL/L
AST SERPL-CCNC: 31 U/L
BILIRUB SERPL-MCNC: 0.9 MG/DL
BUN SERPL-MCNC: 15 MG/DL
CALCIUM SERPL-MCNC: 10.3 MG/DL
CALCIUM SERPL-MCNC: 10.3 MG/DL
CHLORIDE SERPL-SCNC: 102 MMOL/L
CO2 SERPL-SCNC: 25 MMOL/L
CREAT SERPL-MCNC: 0.73 MG/DL
EGFR: 82 ML/MIN/1.73M2
GLUCOSE SERPL-MCNC: 83 MG/DL
MITOCHONDRIA AB SER IF-ACNC: NORMAL
PARATHYROID HORMONE INTACT: 62 PG/ML
POTASSIUM SERPL-SCNC: 4.5 MMOL/L
PROT SERPL-MCNC: 6.7 G/DL
SODIUM SERPL-SCNC: 140 MMOL/L

## 2023-08-23 ENCOUNTER — RX RENEWAL (OUTPATIENT)
Age: 83
End: 2023-08-23

## 2023-09-11 ENCOUNTER — RX RENEWAL (OUTPATIENT)
Age: 83
End: 2023-09-11

## 2023-09-15 ENCOUNTER — NON-APPOINTMENT (OUTPATIENT)
Age: 83
End: 2023-09-15

## 2023-09-15 ENCOUNTER — APPOINTMENT (OUTPATIENT)
Dept: OPHTHALMOLOGY | Facility: CLINIC | Age: 83
End: 2023-09-15
Payer: MEDICARE

## 2023-09-15 PROCEDURE — 92133 CPTRZD OPH DX IMG PST SGM ON: CPT

## 2023-09-15 PROCEDURE — 92014 COMPRE OPH EXAM EST PT 1/>: CPT

## 2023-09-28 ENCOUNTER — APPOINTMENT (OUTPATIENT)
Dept: INTERNAL MEDICINE | Facility: CLINIC | Age: 83
End: 2023-09-28
Payer: MEDICARE

## 2023-09-28 VITALS
OXYGEN SATURATION: 98 % | BODY MASS INDEX: 21.58 KG/M2 | WEIGHT: 118 LBS | DIASTOLIC BLOOD PRESSURE: 60 MMHG | HEART RATE: 87 BPM | RESPIRATION RATE: 14 BRPM | SYSTOLIC BLOOD PRESSURE: 122 MMHG | TEMPERATURE: 98.4 F

## 2023-09-28 DIAGNOSIS — E83.52 HYPERCALCEMIA: ICD-10-CM

## 2023-09-28 DIAGNOSIS — R52 PAIN, UNSPECIFIED: ICD-10-CM

## 2023-09-28 DIAGNOSIS — T50.Z95A PAIN, UNSPECIFIED: ICD-10-CM

## 2023-09-28 PROCEDURE — 99214 OFFICE O/P EST MOD 30 MIN: CPT

## 2023-09-29 LAB
ALBUMIN SERPL ELPH-MCNC: 4.7 G/DL
ALP BLD-CCNC: 128 U/L
ALT SERPL-CCNC: 31 U/L
ANION GAP SERPL CALC-SCNC: 11 MMOL/L
AST SERPL-CCNC: 25 U/L
BASOPHILS # BLD AUTO: 0.04 K/UL
BASOPHILS NFR BLD AUTO: 0.4 %
BILIRUB SERPL-MCNC: 1.2 MG/DL
BUN SERPL-MCNC: 16 MG/DL
CALCIUM SERPL-MCNC: 10.1 MG/DL
CHLORIDE SERPL-SCNC: 101 MMOL/L
CO2 SERPL-SCNC: 24 MMOL/L
CREAT SERPL-MCNC: 0.68 MG/DL
EGFR: 86 ML/MIN/1.73M2
EOSINOPHIL # BLD AUTO: 0.82 K/UL
EOSINOPHIL NFR BLD AUTO: 7.3 %
GLUCOSE SERPL-MCNC: 90 MG/DL
HCT VFR BLD CALC: 43 %
HGB BLD-MCNC: 13.6 G/DL
IMM GRANULOCYTES NFR BLD AUTO: 0.4 %
LYMPHOCYTES # BLD AUTO: 2.5 K/UL
LYMPHOCYTES NFR BLD AUTO: 22.1 %
MAN DIFF?: NORMAL
MCHC RBC-ENTMCNC: 29.8 PG
MCHC RBC-ENTMCNC: 31.6 GM/DL
MCV RBC AUTO: 94.1 FL
MONOCYTES # BLD AUTO: 0.78 K/UL
MONOCYTES NFR BLD AUTO: 6.9 %
NEUTROPHILS # BLD AUTO: 7.11 K/UL
NEUTROPHILS NFR BLD AUTO: 62.9 %
PLATELET # BLD AUTO: 255 K/UL
POTASSIUM SERPL-SCNC: 4.6 MMOL/L
PROT SERPL-MCNC: 7.2 G/DL
RBC # BLD: 4.57 M/UL
RBC # FLD: 12.7 %
SODIUM SERPL-SCNC: 136 MMOL/L
WBC # FLD AUTO: 11.29 K/UL

## 2023-10-09 ENCOUNTER — NON-APPOINTMENT (OUTPATIENT)
Age: 83
End: 2023-10-09

## 2023-10-11 ENCOUNTER — NON-APPOINTMENT (OUTPATIENT)
Age: 83
End: 2023-10-11

## 2023-10-16 LAB
ALBUMIN SERPL ELPH-MCNC: 4.3 G/DL
ALP BLD-CCNC: 92 U/L
ALT SERPL-CCNC: 19 U/L
ANION GAP SERPL CALC-SCNC: 16 MMOL/L
AST SERPL-CCNC: 21 U/L
BASOPHILS # BLD AUTO: 0.04 K/UL
BASOPHILS NFR BLD AUTO: 0.5 %
BILIRUB SERPL-MCNC: 1 MG/DL
BUN SERPL-MCNC: 16 MG/DL
CALCIUM SERPL-MCNC: 9.8 MG/DL
CHLORIDE SERPL-SCNC: 101 MMOL/L
CO2 SERPL-SCNC: 21 MMOL/L
CREAT SERPL-MCNC: 0.71 MG/DL
EGFR: 84 ML/MIN/1.73M2
EOSINOPHIL # BLD AUTO: 0.54 K/UL
EOSINOPHIL NFR BLD AUTO: 7 %
GLUCOSE SERPL-MCNC: 96 MG/DL
HCT VFR BLD CALC: 41.3 %
HGB BLD-MCNC: 13.1 G/DL
IMM GRANULOCYTES NFR BLD AUTO: 0.4 %
LYMPHOCYTES # BLD AUTO: 1.84 K/UL
LYMPHOCYTES NFR BLD AUTO: 23.8 %
MAN DIFF?: NORMAL
MCHC RBC-ENTMCNC: 30.3 PG
MCHC RBC-ENTMCNC: 31.7 GM/DL
MCV RBC AUTO: 95.4 FL
MONOCYTES # BLD AUTO: 0.84 K/UL
MONOCYTES NFR BLD AUTO: 10.9 %
NEUTROPHILS # BLD AUTO: 4.43 K/UL
NEUTROPHILS NFR BLD AUTO: 57.4 %
PLATELET # BLD AUTO: 209 K/UL
POTASSIUM SERPL-SCNC: 4.1 MMOL/L
PROT SERPL-MCNC: 6.6 G/DL
RBC # BLD: 4.33 M/UL
RBC # FLD: 13.1 %
SODIUM SERPL-SCNC: 138 MMOL/L
TSH SERPL-ACNC: 1.79 UIU/ML
WBC # FLD AUTO: 7.72 K/UL

## 2023-11-01 ENCOUNTER — APPOINTMENT (OUTPATIENT)
Dept: INTERNAL MEDICINE | Facility: CLINIC | Age: 83
End: 2023-11-01
Payer: MEDICARE

## 2023-11-01 ENCOUNTER — APPOINTMENT (OUTPATIENT)
Dept: RADIOLOGY | Facility: CLINIC | Age: 83
End: 2023-11-01
Payer: MEDICARE

## 2023-11-01 DIAGNOSIS — R05.9 COUGH, UNSPECIFIED: ICD-10-CM

## 2023-11-01 PROCEDURE — 71046 X-RAY EXAM CHEST 2 VIEWS: CPT

## 2023-11-01 PROCEDURE — 99214 OFFICE O/P EST MOD 30 MIN: CPT

## 2023-11-15 ENCOUNTER — APPOINTMENT (OUTPATIENT)
Dept: PAIN MANAGEMENT | Facility: CLINIC | Age: 83
End: 2023-11-15
Payer: MEDICARE

## 2023-11-15 PROCEDURE — 99213 OFFICE O/P EST LOW 20 MIN: CPT

## 2023-11-17 ENCOUNTER — RX RENEWAL (OUTPATIENT)
Age: 83
End: 2023-11-17

## 2023-11-17 ENCOUNTER — NON-APPOINTMENT (OUTPATIENT)
Age: 83
End: 2023-11-17

## 2023-12-01 ENCOUNTER — TRANSCRIPTION ENCOUNTER (OUTPATIENT)
Age: 83
End: 2023-12-01

## 2023-12-01 NOTE — ASU PATIENT PROFILE, ADULT - NSICDXPASTSURGICALHX_GEN_ALL_CORE_FT
PAST SURGICAL HISTORY:  H/O hernia repair     History of tonsillectomy     No significant past surgical history     Status post cholecystectomy

## 2023-12-01 NOTE — ASU PATIENT PROFILE, ADULT - FALL HARM RISK - UNIVERSAL INTERVENTIONS
Bed in lowest position, wheels locked, appropriate side rails in place/Call bell, personal items and telephone in reach/Instruct patient to call for assistance before getting out of bed or chair/Non-slip footwear when patient is out of bed/[place] to call system/Physically safe environment - no spills, clutter or unnecessary equipment/Purposeful Proactive Rounding/Room/bathroom lighting operational, light cord in reach Bed in lowest position, wheels locked, appropriate side rails in place/Call bell, personal items and telephone in reach/Instruct patient to call for assistance before getting out of bed or chair/Non-slip footwear when patient is out of bed/[place] to call system/Physically safe environment - no spills, clutter or unnecessary equipment/Purposeful Proactive Rounding/Room/bathroom lighting operational, light cord in reach Bed in lowest position, wheels locked, appropriate side rails in place/Call bell, personal items and telephone in reach/Instruct patient to call for assistance before getting out of bed or chair/Non-slip footwear when patient is out of bed/[place] to call system/Physically safe environment - no spills, clutter or unnecessary equipment/Purposeful Proactive Rounding/Room/bathroom lighting operational, light cord in reach

## 2023-12-01 NOTE — ASU PATIENT PROFILE, ADULT - NSICDXNOPASTSURGICALHX_GEN_ALL_CORE
Step-by-Step  Using an Inhaler with a Spacer    Date Last Reviewed: 2/1/2017  © 9309-5881 [employer] [address] [place] [address], [place] [address]. All rights reserved. This information is not intended as a substitute for professional medical care. Always follow your healthcare professional's instructions.         <-- Click to add NO significant Past Surgical History

## 2023-12-01 NOTE — ASU PATIENT PROFILE, ADULT - NSICDXPASTMEDICALHX_GEN_ALL_CORE_FT
PAST MEDICAL HISTORY:  Aortic valve regurgitation mild    Atrial fibrillation     Carotid stenosis     HLD (hyperlipidemia)     HTN (hypertension)     Hyperlipidemia     Hypertension     Mitral regurgitation Mild    Seizure     Seizure disorder least episode 1.5 years ago    
173

## 2023-12-01 NOTE — ASU DISCHARGE PLAN (ADULT/PEDIATRIC) - NS MD DC FALL RISK RISK
For information on Fall & Injury Prevention, visit: [email or web address]://www.[email or web address]/[email or web address]-prevention-protects-[email or web address]-maintains-health-and-mobility OR  https://www.[facility]/news/fall-prevention-tips-[email or web address]-injury OR  https://www.cdc.gov/steadi/patient.html For information on Fall & Injury Prevention, visit: https://www.[facility]/news/fall-prevention-protects-[email or web address]-maintains-health-and-mobility OR  https://www.[facility]/news/fall-prevention-tips-[email or web address]-injury OR  https://www.cdc.gov/steadi/patient.html For information on Fall & Injury Prevention, visit: https://www.[facility]/news/fall-prevention-protects-and-maintains-health-and-mobility OR  https://www.[facility]/news/fall-prevention-tips-to-avoid-injury OR  https://www.cdc.gov/steadi/patient.html

## 2023-12-04 ENCOUNTER — APPOINTMENT (OUTPATIENT)
Dept: ORTHOPEDIC SURGERY | Facility: HOSPITAL | Age: 83
End: 2023-12-04
Payer: MEDICARE

## 2023-12-04 ENCOUNTER — OUTPATIENT (OUTPATIENT)
Dept: OUTPATIENT SERVICES | Facility: HOSPITAL | Age: 83
LOS: 1 days | End: 2023-12-04
Payer: MEDICARE

## 2023-12-04 VITALS
WEIGHT: 110.01 LBS | OXYGEN SATURATION: 99 % | TEMPERATURE: 98 F | RESPIRATION RATE: 15 BRPM | SYSTOLIC BLOOD PRESSURE: 150 MMHG | HEART RATE: 87 BPM | DIASTOLIC BLOOD PRESSURE: 63 MMHG | HEIGHT: 62 IN

## 2023-12-04 VITALS
SYSTOLIC BLOOD PRESSURE: 147 MMHG | TEMPERATURE: 98 F | RESPIRATION RATE: 20 BRPM | OXYGEN SATURATION: 100 % | DIASTOLIC BLOOD PRESSURE: 62 MMHG | HEART RATE: 100 BPM

## 2023-12-04 DIAGNOSIS — Z98.890 OTHER SPECIFIED POSTPROCEDURAL STATES: Chronic | ICD-10-CM

## 2023-12-04 DIAGNOSIS — Z90.89 ACQUIRED ABSENCE OF OTHER ORGANS: Chronic | ICD-10-CM

## 2023-12-04 DIAGNOSIS — M54.16 RADICULOPATHY, LUMBAR REGION: ICD-10-CM

## 2023-12-04 DIAGNOSIS — Z90.49 ACQUIRED ABSENCE OF OTHER SPECIFIED PARTS OF DIGESTIVE TRACT: Chronic | ICD-10-CM

## 2023-12-04 PROCEDURE — 62323 NJX INTERLAMINAR LMBR/SAC: CPT

## 2023-12-04 RX ORDER — LAMOTRIGINE 25 MG/1
1 TABLET, ORALLY DISINTEGRATING ORAL
Qty: 0 | Refills: 0 | DISCHARGE

## 2023-12-04 RX ORDER — ASPIRIN/CALCIUM CARB/MAGNESIUM 324 MG
1 TABLET ORAL
Qty: 0 | Refills: 0 | DISCHARGE

## 2023-12-04 RX ORDER — ATORVASTATIN CALCIUM 80 MG/1
1 TABLET, FILM COATED ORAL
Qty: 0 | Refills: 0 | DISCHARGE

## 2023-12-04 RX ORDER — AMLODIPINE BESYLATE 2.5 MG/1
1 TABLET ORAL
Qty: 0 | Refills: 0 | DISCHARGE

## 2023-12-04 RX ORDER — LOSARTAN POTASSIUM 100 MG/1
1 TABLET, FILM COATED ORAL
Qty: 0 | Refills: 0 | DISCHARGE

## 2023-12-08 ENCOUNTER — RX RENEWAL (OUTPATIENT)
Age: 83
End: 2023-12-08

## 2023-12-11 ENCOUNTER — RX RENEWAL (OUTPATIENT)
Age: 83
End: 2023-12-11

## 2023-12-11 ENCOUNTER — APPOINTMENT (OUTPATIENT)
Dept: CARDIOLOGY | Facility: CLINIC | Age: 83
End: 2023-12-11
Payer: MEDICARE

## 2023-12-11 VITALS
SYSTOLIC BLOOD PRESSURE: 138 MMHG | HEART RATE: 80 BPM | WEIGHT: 113 LBS | HEIGHT: 62 IN | OXYGEN SATURATION: 99 % | DIASTOLIC BLOOD PRESSURE: 83 MMHG | BODY MASS INDEX: 20.8 KG/M2

## 2023-12-11 DIAGNOSIS — I34.0 NONRHEUMATIC MITRAL (VALVE) INSUFFICIENCY: ICD-10-CM

## 2023-12-11 PROCEDURE — 99215 OFFICE O/P EST HI 40 MIN: CPT | Mod: 25

## 2023-12-11 PROCEDURE — 93000 ELECTROCARDIOGRAM COMPLETE: CPT

## 2023-12-18 NOTE — ASU PREOP CHECKLIST - SITE MARKED BY SURGEON
Spoke to pt regarding her current condition. 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 She states that she was planning to travel out of town for the holidays, cautioned her against doing so. Discussed e-consult with pt, which she verbally consented to. Will e- ocnsult hem/onc on pt behalf.  
yes

## 2024-03-10 ENCOUNTER — RX RENEWAL (OUTPATIENT)
Age: 84
End: 2024-03-10

## 2024-04-11 ENCOUNTER — APPOINTMENT (OUTPATIENT)
Dept: INTERNAL MEDICINE | Facility: CLINIC | Age: 84
End: 2024-04-11
Payer: MEDICARE

## 2024-04-11 DIAGNOSIS — M81.0 AGE-RELATED OSTEOPOROSIS W/OUT CURRENT PATHOLOGICAL FRACTURE: ICD-10-CM

## 2024-04-11 DIAGNOSIS — G40.909 EPILEPSY, UNSPECIFIED, NOT INTRACTABLE, W/OUT STATUS EPILEPTICUS: ICD-10-CM

## 2024-04-11 DIAGNOSIS — R30.0 DYSURIA: ICD-10-CM

## 2024-04-11 DIAGNOSIS — E78.5 HYPERLIPIDEMIA, UNSPECIFIED: ICD-10-CM

## 2024-04-11 LAB
BILIRUB UR QL STRIP: NEGATIVE
CLARITY UR: CLEAR
COLLECTION METHOD: NORMAL
GLUCOSE UR-MCNC: NEGATIVE
HCG UR QL: 0.2 EU/DL
HGB UR QL STRIP.AUTO: NORMAL
KETONES UR-MCNC: NEGATIVE
LEUKOCYTE ESTERASE UR QL STRIP: NORMAL
NITRITE UR QL STRIP: NEGATIVE
PH UR STRIP: 7
PROT UR STRIP-MCNC: NEGATIVE
SP GR UR STRIP: 1.02

## 2024-04-11 PROCEDURE — 81003 URINALYSIS AUTO W/O SCOPE: CPT | Mod: QW

## 2024-04-11 PROCEDURE — G2211 COMPLEX E/M VISIT ADD ON: CPT

## 2024-04-11 PROCEDURE — 99214 OFFICE O/P EST MOD 30 MIN: CPT

## 2024-04-11 RX ORDER — BENZONATATE 100 MG/1
100 CAPSULE ORAL 3 TIMES DAILY
Qty: 30 | Refills: 0 | Status: DISCONTINUED | COMMUNITY
Start: 2023-10-10 | End: 2024-04-11

## 2024-04-11 RX ORDER — NITROFURANTOIN (MONOHYDRATE/MACROCRYSTALS) 25; 75 MG/1; MG/1
100 CAPSULE ORAL
Qty: 14 | Refills: 0 | Status: ACTIVE | COMMUNITY
Start: 2022-08-24 | End: 1900-01-01

## 2024-04-11 RX ORDER — DOXYCYCLINE HYCLATE 100 MG/1
100 TABLET ORAL
Qty: 14 | Refills: 0 | Status: DISCONTINUED | COMMUNITY
Start: 2023-10-10 | End: 2024-04-11

## 2024-04-11 RX ORDER — AMOXICILLIN 500 MG/1
500 CAPSULE ORAL
Qty: 21 | Refills: 0 | Status: DISCONTINUED | COMMUNITY
Start: 2022-11-14 | End: 2024-04-11

## 2024-04-11 RX ORDER — METHOCARBAMOL 500 MG/1
500 TABLET, FILM COATED ORAL
Qty: 30 | Refills: 0 | Status: DISCONTINUED | COMMUNITY
Start: 2022-10-27 | End: 2024-04-11

## 2024-04-11 NOTE — ASSESSMENT
[FreeTextEntry1] : dyslipidemia continue atorvastatin  HTN continue amlodipine  PAF continue eliquis  uti rx for macrobid  seizure disorder continue lamictal  osteoporosis doesn't want to start meds

## 2024-04-11 NOTE — HISTORY OF PRESENT ILLNESS
[FreeTextEntry8] : cc: uti  Pt states she drove home from [place] on 4/4 to 4/5 last week. She did not make many stops to use [place]. Stayed overnight in [place] along the way. When she got home felt like she had to urinate a lot. Getting up 4-5 times at night. Feels like she can't hold it in. During the day subsides. Slight burning.   Her epilepsy doctor is Dr Jamie Sykes.   Did P.T. while in [place].

## 2024-04-11 NOTE — PHYSICAL EXAM
[Normal] : affect was normal and insight and judgment were intact [de-identified] : suprapubic tenderness [de-identified] : kyphosis

## 2024-04-11 NOTE — HEALTH RISK ASSESSMENT
[No] : No [Little interest or pleasure doing things] : 1) Little interest or pleasure doing things [Feeling down, depressed, or hopeless] : 2) Feeling down, depressed, or hopeless [0] : 2) Feeling down, depressed, or hopeless: Not at all (0) [PHQ-2 Negative - No further assessment needed] : PHQ-2 Negative - No further assessment needed [[id number]] : 0 [Never] : Never

## 2024-04-12 LAB
APPEARANCE: CLEAR
BACTERIA: NEGATIVE /HPF
BILIRUBIN URINE: NEGATIVE
BLOOD URINE: ABNORMAL
CAST: 0 /LPF
COLOR: YELLOW
EPITHELIAL CELLS: 1 /HPF
GLUCOSE QUALITATIVE U: NEGATIVE MG/DL
KETONES URINE: NEGATIVE MG/DL
LEUKOCYTE ESTERASE URINE: ABNORMAL
MICROSCOPIC-UA: NORMAL
NITRITE URINE: NEGATIVE
PH URINE: 7.5
PROTEIN URINE: NEGATIVE MG/DL
RED BLOOD CELLS URINE: 16 /HPF
SPECIFIC GRAVITY URINE: 1.01
UROBILINOGEN URINE: 0.2 MG/DL
WHITE BLOOD CELLS URINE: 8 /HPF

## 2024-04-14 LAB — BACTERIA UR CULT: ABNORMAL

## 2024-04-17 ENCOUNTER — RX RENEWAL (OUTPATIENT)
Age: 84
End: 2024-04-17

## 2024-04-17 RX ORDER — METOPROLOL SUCCINATE 25 MG/1
25 TABLET, EXTENDED RELEASE ORAL DAILY
Qty: 90 | Refills: 3 | Status: ACTIVE | COMMUNITY
Start: 2017-12-04 | End: 1900-01-01

## 2024-05-22 ENCOUNTER — APPOINTMENT (OUTPATIENT)
Dept: ORTHOPEDIC SURGERY | Facility: CLINIC | Age: 84
End: 2024-05-22

## 2024-05-24 ENCOUNTER — APPOINTMENT (OUTPATIENT)
Dept: ORTHOPEDIC SURGERY | Facility: CLINIC | Age: 84
End: 2024-05-24
Payer: MEDICARE

## 2024-05-24 VITALS — HEIGHT: 62 IN | WEIGHT: 113 LBS | BODY MASS INDEX: 20.8 KG/M2

## 2024-05-24 DIAGNOSIS — G56.01 CARPAL TUNNEL SYNDROME, RIGHT UPPER LIMB: ICD-10-CM

## 2024-05-24 PROCEDURE — J3490M: CUSTOM

## 2024-05-24 PROCEDURE — 99213 OFFICE O/P EST LOW 20 MIN: CPT | Mod: 25

## 2024-05-24 PROCEDURE — 20526 THER INJECTION CARP TUNNEL: CPT | Mod: LT

## 2024-05-24 NOTE — PHYSICAL EXAM
[de-identified] : R hand:  Tender volar wrist  Good finger ROM  +Tinels  +Phalens  +Compression test  Decreased sensation median nerve distribution +thenar atrophy   L hand:  Tender volar wrist  Good finger ROM  +Tinels  +Phalens  +Compression test  Decreased sensation median nerve distribution +thenar atrophy

## 2024-05-24 NOTE — HISTORY OF PRESENT ILLNESS
[de-identified] : L hand worse than R hand numbness and tingling Keeps her up at night Started about 6-8 months ago Getting worse [6] : 6 [Dull/Aching] : dull/aching [Tingling] : tingling [Heat] : heat [] : no [FreeTextEntry1] :  hands,wrists [FreeTextEntry5] : has n/t no injury worse at bright wears brace [de-identified] : activity

## 2024-05-24 NOTE — ASSESSMENT
[FreeTextEntry1] : I rec EMG REturn after EMG  L carpal tunnel injection was performed because of numbness and tingling  Anesthesia: ethyl chloride sprayed topically Celestone 6mg: An injection of Celestone 1cc Lidocaine: An injection of Lidocaine 1% 1cc Marcaine: An injection of Marcaine 0.5% 1cc   Patient has tried OTC's and HEP After verbal consent using sterile preparation and technique. The risks, benefits, and alternatives to cortisone injection were explained in full to the patient. Risks outlined include but are not limited to infection, sepsis, bleeding, scarring, skin discoloration, temporary increase in pain, syncopal episode, failure to resolve symptoms, allergic reaction, symptom recurrence, and elevation of blood sugar in diabetics. Patient understood the risks. All questions were answered. After discussion of options, patient requested an injection. Oral informed consent was obtained and sterile prep was done of the injection site. Sterile technique was utilized for the procedure including the preparation of the solutions used for the injection. Patient tolerated the procedure well. Advised to ice the injection site this evening. Prep with betadine locally to site. Sterile technique used  I rec therapy for stiffness to improve ROM

## 2024-06-03 ENCOUNTER — APPOINTMENT (OUTPATIENT)
Dept: NEUROLOGY | Facility: CLINIC | Age: 84
End: 2024-06-03
Payer: MEDICARE

## 2024-06-03 PROCEDURE — 95912 NRV CNDJ TEST 11-12 STUDIES: CPT

## 2024-06-04 ENCOUNTER — APPOINTMENT (OUTPATIENT)
Dept: PAIN MANAGEMENT | Facility: CLINIC | Age: 84
End: 2024-06-04
Payer: MEDICARE

## 2024-06-04 ENCOUNTER — NON-APPOINTMENT (OUTPATIENT)
Age: 84
End: 2024-06-04

## 2024-06-04 DIAGNOSIS — M54.16 RADICULOPATHY, LUMBAR REGION: ICD-10-CM

## 2024-06-04 PROCEDURE — 99213 OFFICE O/P EST LOW 20 MIN: CPT

## 2024-06-04 NOTE — ASSESSMENT
[FreeTextEntry1] : Interim history The patient returns with pain that has been treated adequately in the past with epidural steroid injections.  The patient's complaints are consistent with radiculopathy. Patient's ADL's increase with prior KENAN.   The last injection gave greater than 60% reduction of the pain but now there is a return of symptoms. The patient is requesting a subsequent epidural steroid injection to alleviate pain and improve functional ability and quality of life.  Patient is a candidate. Objective findings Since the last visit, there have been no new imaging studies. The patient has no new symptoms except the return of the their pain complaints. Motor and sensory function is unchanged. Plan Spoke to patient about epidural steroid injections. Explained risks, benefits and alternatives including but not limited to the risk of infection, bleeding, headache, syncopal episode, failure to resolve issues, allergic reaction, symptom recurrence, allergic reaction, nerve injury, and increased pain. The patient understands the risks. All questions were answered. The patient is willing to proceed.  This note was generated by using Dragon medical dictation software.  A reasonable effort has been made for proofreading its contents, but typos may still remain.  If there are any questions or points of clarification needed, please notify my office.

## 2024-06-04 NOTE — HISTORY OF PRESENT ILLNESS
[Lower back] : lower back [6] : 6 [2] : 2 [Dull/Aching] : dull/aching [Radiating] : radiating [Intermittent] : intermittent [Rest] : rest [Meds] : meds [Injection therapy] : injection therapy [Standing] : standing [Walking] : walking [Bending forward] : bending forward [Retired] : Work status: retired [] : no [FreeTextEntry1] : hips [FreeTextEntry9] : Tylenol  [de-identified] : 11/17/2022 [de-identified] : JOY  [de-identified] : 08/30/2022

## 2024-06-07 ENCOUNTER — APPOINTMENT (OUTPATIENT)
Dept: ORTHOPEDIC SURGERY | Facility: CLINIC | Age: 84
End: 2024-06-07
Payer: MEDICARE

## 2024-06-07 ENCOUNTER — TRANSCRIPTION ENCOUNTER (OUTPATIENT)
Age: 84
End: 2024-06-07

## 2024-06-07 VITALS — HEIGHT: 62 IN | WEIGHT: 113 LBS | BODY MASS INDEX: 20.8 KG/M2

## 2024-06-07 DIAGNOSIS — G56.02 CARPAL TUNNEL SYNDROME, LEFT UPPER LIMB: ICD-10-CM

## 2024-06-07 PROCEDURE — 99213 OFFICE O/P EST LOW 20 MIN: CPT

## 2024-06-07 NOTE — ASU PATIENT PROFILE, ADULT - NSICDXPASTMEDICALHX_GEN_ALL_CORE_FT
PAST MEDICAL HISTORY:  Aortic valve regurgitation mild    Atrial fibrillation     Carotid stenosis     HLD (hyperlipidemia)     HTN (hypertension)     Hyperlipidemia     Hypertension     Mitral regurgitation Mild    Seizure     Seizure disorder least episode 1.5 years ago

## 2024-06-07 NOTE — ASSESSMENT
[FreeTextEntry1] : I disucssed surgery with her today  I explained that injeciton will wear off at some point and she will require surgery at some point Return prn

## 2024-06-07 NOTE — HISTORY OF PRESENT ILLNESS
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

## 2024-06-07 NOTE — PHYSICAL EXAM
[de-identified] : L hand:  Tender volar wrist  Good finger ROM  +Tinels  +Phalens  +Compression test  Decreased sensation median nerve distribution +thenar atrophy

## 2024-06-07 NOTE — ASU DISCHARGE PLAN (ADULT/PEDIATRIC) - NS MD DC FALL RISK RISK
For information on Fall & Injury Prevention, visit: [email or web address]://www.[facility]/[email or web address]-prevention-protects-and-maintains-health-and-mobility OR  https://www.[facility]/news/fall-prevention-tips-[email or web address]-injury OR  https://www.cdc.gov/steadi/patient.html

## 2024-06-08 ENCOUNTER — RX RENEWAL (OUTPATIENT)
Age: 84
End: 2024-06-08

## 2024-06-08 RX ORDER — LOSARTAN POTASSIUM 100 MG/1
100 TABLET, FILM COATED ORAL DAILY
Qty: 90 | Refills: 0 | Status: ACTIVE | COMMUNITY
Start: 2020-07-20 | End: 1900-01-01

## 2024-06-10 ENCOUNTER — APPOINTMENT (OUTPATIENT)
Dept: ORTHOPEDIC SURGERY | Facility: HOSPITAL | Age: 84
End: 2024-06-10
Payer: MEDICARE

## 2024-06-10 ENCOUNTER — OUTPATIENT (OUTPATIENT)
Dept: OUTPATIENT SERVICES | Facility: HOSPITAL | Age: 84
LOS: 1 days | End: 2024-06-10
Payer: MEDICARE

## 2024-06-10 VITALS
HEIGHT: 62 IN | DIASTOLIC BLOOD PRESSURE: 61 MMHG | SYSTOLIC BLOOD PRESSURE: 136 MMHG | TEMPERATURE: 98 F | HEART RATE: 76 BPM | WEIGHT: 113.1 LBS | OXYGEN SATURATION: 100 % | RESPIRATION RATE: 19 BRPM

## 2024-06-10 VITALS
HEART RATE: 95 BPM | RESPIRATION RATE: 19 BRPM | DIASTOLIC BLOOD PRESSURE: 79 MMHG | TEMPERATURE: 98 F | SYSTOLIC BLOOD PRESSURE: 130 MMHG | OXYGEN SATURATION: 100 %

## 2024-06-10 DIAGNOSIS — Z98.890 OTHER SPECIFIED POSTPROCEDURAL STATES: Chronic | ICD-10-CM

## 2024-06-10 DIAGNOSIS — M54.16 RADICULOPATHY, LUMBAR REGION: ICD-10-CM

## 2024-06-10 DIAGNOSIS — Z90.49 ACQUIRED ABSENCE OF OTHER SPECIFIED PARTS OF DIGESTIVE TRACT: Chronic | ICD-10-CM

## 2024-06-10 DIAGNOSIS — Z90.89 ACQUIRED ABSENCE OF OTHER ORGANS: Chronic | ICD-10-CM

## 2024-06-10 PROCEDURE — 62323 NJX INTERLAMINAR LMBR/SAC: CPT

## 2024-06-11 ENCOUNTER — APPOINTMENT (OUTPATIENT)
Dept: CARDIOLOGY | Facility: CLINIC | Age: 84
End: 2024-06-11
Payer: MEDICARE

## 2024-06-11 VITALS
SYSTOLIC BLOOD PRESSURE: 146 MMHG | DIASTOLIC BLOOD PRESSURE: 64 MMHG | HEIGHT: 62 IN | OXYGEN SATURATION: 100 % | WEIGHT: 112 LBS | HEART RATE: 75 BPM | BODY MASS INDEX: 20.61 KG/M2

## 2024-06-11 DIAGNOSIS — I07.1 RHEUMATIC TRICUSPID INSUFFICIENCY: ICD-10-CM

## 2024-06-11 DIAGNOSIS — I35.1 NONRHEUMATIC AORTIC (VALVE) INSUFFICIENCY: ICD-10-CM

## 2024-06-11 PROCEDURE — 93000 ELECTROCARDIOGRAM COMPLETE: CPT

## 2024-06-11 PROCEDURE — 99215 OFFICE O/P EST HI 40 MIN: CPT

## 2024-06-11 PROCEDURE — G2211 COMPLEX E/M VISIT ADD ON: CPT

## 2024-06-11 NOTE — HISTORY OF PRESENT ILLNESS
[FreeTextEntry1] : Mrs. Kaylyn Schultz presented to the office today for cardiac evaluation.  I last evaluated the patient in the office on 12/11/2023.  The patient is an 84-year-old female with a history of paroxysmal atrial  fibrillation, hypertension, a dyslipidemia, pre-diabetes, mild to moderate aortic regurgitation, mild mitral regurgitation, severe tricuspid regurgitation, mild to moderate carotid atherosclerosis, supraventricular ectopy (including non-sustained supraventricular tachycardia exhibited on Holter monitoring), asthma, mild obstructive sleep apnea, osteoporosis, bilateral carpal tunnel syndrome, lumbar degenerative disc disease (status post bilateral L3-4-5 medial branch facet blocks 12/8/2022; most recently status post LESI 6/10/2023), and transient amnesia, suspected as being related to seizure activity.  The patient has been stable from a cardiac symptomatic standpoint since her previous visit here on 12/11/2023. Specifically, she has not experienced chest discomfort or dyspnea on exertion in association with her activities, which include walking her dog each morning. She has not noted orthopnea or paroxysmal nocturnal dyspnea. She has occasionally noted mild dependent ankle swelling, without any appreciable change in the degree since her previous visit here.  She has not experienced any episodes of palpitations, presyncope or syncope.  At the time of a previous visit here on 11/29/2022, she was discovered to be exhibiting asymptomatic paroxysmal atrial fibrillation with a controlled ventricular response.  Dr. Lira referred her for a Holter monitor study, which was performed on 11/29/2022, revealing atrial fibrillation throughout the recording with a controlled ventricular response and rare to occasional ventricular premature contractions.  The patient had followed up with her cardiologist in [place] for a routine visit in February of 2020, at which time aspirin was discontinued secondary to the patient experiencing easy bruisability, and the dosage of Eliquis was reduced from 5 mg b.i.d. to 2.5 mg b.i.d., in view of the patient's advanced age.  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 She experienced an episode of gross hematuria in October of 2018, for which she followed up with a urologist, including having had a CT scan of the abdomen/pelvis performed, which she describes as having been unrevealing. She reports having experienced recurrence of gross hematuria on one occasion in January of 2019, without recurrence since that time. She underwent a spinal radiofrequency ablation procedure involving L5-S1 on 5/9/19.  She most recently underwent LESI on 6/10/2024.  The patient was evaluated in the emergency room at [facility] on 6/11/20, having presented with vertigo. A CT scan of the head, electrocardiogram, and blood testing were unrevealing at that time.  She consulted with a urologist on 8/19/2021 regarding suprapubic pain, and was diagnosed as having left hydronephrosis (which spontaneously resolved) and a left renal cyst.  She tripped and fell in her backyard on 10/6/2021, sustaining 3 fractured ribs on the right and a left knee bruise.  As far as risk factors for coronary artery disease are concerned, the patient has a history of hypertension, a dyslipidemia, and pre-diabetes. She denies a history of cigarette smoking. She does have an immediate family history of premature coronary artery disease in her son, who is also under my care.  A lipid profile performed on 11/18/2022 revealed cholesterol 161, triglycerides 46, HDL 83, and calculated LDL 69.  Laboratory studies performed on 10/10/2023 revealed the liver chemistries to be normal.  The BUN and creatinine were 16 and 0.7, respectively.  The potassium level was 4.1.  The glucose level was 96.  The hemoglobin and hematocrit were 13.1 and 41.3, respectively.  The TSH level was 1.79.  A 24-hour ambulatory blood pressure monitoring study performed from 11/11/21 - 11/12/22 revealed the average reading to be 121/57, with 19% of systolic readings being in the elevated range and no diastolic readings being in the elevated range.  A 24-hour Holter monitor study performed on 11/29/2022 revealed the rhythm throughout the recording to be atrial fibrillation with an average ventricular rate of 94 bpm (range 65 to 134 bpm).  Rare to occasional ventricular premature contractions (0.6% of all beats) were exhibited.  No episodes of ventricular tachycardia were exhibited.  No significant pauses were exhibited.  Exercise stress testing performed on 12/12/17 revealed the patient to exhibit reduced exercise tolerance, without the inducement of cardiac symptoms, electrocardiographic evidence of myocardial ischemia, or significant arrhythmias at 65% of the age-predicted maximum heart rate.  Echocardiography most recently performed on 6/21/2023 revealed the left atrium to be moderately dilated and the right atrium to be severely dilated.  The ventricles were normal in dimension.  Left ventricular wall thickness and wall motion were normal, with a calculated ejection fraction of 62%.  Mild aortic regurgitation, mild mitral regurgitation, and severe tricuspid regurgitation were demonstrated, with an estimated pulmonary artery systolic pressure 48 mmHg.  A small pericardial effusion was noted adjacent to the right ventricle.  There was no echocardiographic evidence of pericardial tamponade.  Carotid artery Doppler testing most recently performed on 6/1/2022 revealed mild plaque formation involving the common carotid arteries bilaterally, moderate plaque formation involving the right carotid bulb and the right internal carotid artery, mild to moderate plaque formation involving the left carotid bulb, and moderate plaque formation involving the left internal carotid artery.  No significant stenoses were demonstrated.  Previous History:  I initially evaluated the patient on 11/25/15, regarding transient episodes of amnesia, which were suspicious for seizures. Following that visit here, the patient experienced 2 recurrent transient episodes of amnesia. She had a second neurological opinion with Dr. 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 The patient was subsequently started on Lamotrigine.  At the time of her initial visit here on 11/25/15, the patient informed me that over the preceding few months, she had experienced 4 randomly-occurring episodes of transient memory impairment. She was evaluated by a neurologist, who referred her for an MRI scan of the brain, an MRA scan of the carotid arteries and cerebral circulation, and an EEG. The MRI scan of the brain, performed on 9/28/15, reportedly revealed mild small vessel ischemic disease. The MRA of the carotid arteries, performed on 9/28/15, reportedly revealed a 50-60% stenosis involving the proximal portion of the right internal carotid artery. The EEG, performed on 10/8/15, was reportedly normal.  The patient presented to her internist's office for a routine follow-up medical evaluation on 12/4/17, at which time she was evaluated by Dr. Chary Dang. The patient was noted to be exhibiting newly-discovered atrial fibrillation with an average ventricular rate of 120 beats per minute on her electrocardiogram. The patient was asymptomatic of the atrial fibrillation, and hence, the duration of this arrhythmia was uncertain. Dr. Dang telephoned me while the patient was in her office that day, to inform me that the patient was exhibiting newly-discovered atrial fibrillation with a moderately tachycardic ventricular response. We discussed initiating beta-blocker therapy (Toprol-XL 25 mg daily) for control of the ventricular response, as well as anticoagulation (Eliquis 5 mg b.i.d.) to decrease the risk of stroke associated with atrial fibrillation. The patient was exhibiting sinus bradycardia at the time of a follow-up visit with me on 12/6/17.  I had referred the patient for follow-up echocardiography to reassess the degree of aortic regurgitation, with this study having been performed on 5/8/17. Moderate to severe tricuspid regurgitation was demonstrated, with an estimated right ventricular systolic pressure of 63 mm mercury. I then referred the patient for coronary consultation regarding the elevated pulmonary artery systolic pressure. Her evaluation included a pulmonary ventilation perfusion scan, we'll which was performed on 5/24/17, and was interpreted as being low probability for pulmonary embolus. The patient underwent a sleep study, which was suggestive of mild obstructive sleep apnea. The patient was evaluated by a pulmonary hypertension specialist, was referred for right heart catheterization, which was performed on 8/1/17. Interestingly, this study revealed a normal pulmonary artery pressure and a normal pulmonary capillary wedge pressure.  Past medical/surgical history according to the patient is otherwise significant for a laparoscopic cholecystectomy, right inguinal hernia repair, tonsillectomy, and 3 full-term uncomplicated pregnancies, delivered vaginally.

## 2024-06-11 NOTE — DISCUSSION/SUMMARY
[FreeTextEntry1] : The patient was noted to be exhibiting newly-discovered asymptomatic atrial fibrillation with a moderately tachycardic ventricular response (120 beats per minute) on an electrocardiogram performed at the time of a routine follow-up medical evaluation on 12/4/17. Beta-blocker therapy (Toprol-XL 25 mg daily) was initiated at that time for control of the ventricular response, and anticoagulation (Eliquis) was initiated at that time to decrease the risk of stroke associated with atrial fibrillation. She was noted to be exhibiting sinus bradycardia at the time for a follow-up visit with me on 12/6/17, and also was noted to be exhibiting predominantly sinus rhythm on a Holter monitor study performed on 12/13/17.  She was noted to be exhibiting asymptomatic recurrence of paroxysmal atrial fibrillation with a controlled ventricular response at the time of a follow-up visit here on 11/29/2022, with a Holter monitor study performed that day revealing atrial fibrillation with a controlled ventricular response persisting throughout the recording.  She is again exhibiting asymptomatic atrial fibrillation at the time of her visit here today.  The patient has been stable from a cardiac symptomatic standpoint since her previous visit here on 12/11/2023. Specifically, she does not describe having experienced any signs or symptoms to suggest the development of an anginal syndrome, congestive heart failure, or a hemodynamically-compromising arrhythmia. She is exhibiting atrial fibrillation with an average ventricular response of 75 bpm on her electrocardiogram today, with the tracing being otherwise unremarkable and essentially unchanged from her previous office tracing. Her cardiac examination today is remarkable for an irregular rhythm with a controlled rate, a soft systolic ejection murmur, and a grade II/VI systolic murmur best heard at the lower left sternal border and in the apical region essentially unchanged from her previous visit with me. Her blood pressure reading is normal today.  I have explained to the patient that she appears to be exhibiting persistent atrial fibrillation at this point, and will likely prove to have permanent atrial fibrillation.  For the time being, I have recommended to her that she continue Toprol-XL 25 mg daily, noting that the ventricular response atrial fibrillation appears to be reasonably well controlled on this dosage of the beta-blocker.  She will continue Eliquis 2.5 mg twice daily to reduce the risk of stroke associated with atrial fibrillation.  As her blood pressure reading today is normal, I have instructed the patient to continue her antihypertensive medications as presently prescribed for the time being.  I have reviewed the findings of the echocardiogram of 6/21/2023 and the carotid artery Doppler study of 6/1/2022 in detail with the patient today.  I am referring the patient for follow-up studies at this point for reassessment.  She is going to make arrangements to have these studies performed through our office, and I will telephone her to discuss the findings, once the studies have been completed.  In view of the finding of significant carotid atherosclerosis on the Doppler study, I have recommended to the patient that she resume aspirin 81 mg daily, and that she call me if she notes recurrence of easy bruisability.  Note that she will be maintained on Eliquis 2.5 mg twice daily in view of her history of paroxysmal atrial fibrillation.  I have again explained the implications of paroxysmal atrial fibrillation in detail to the patient today, including management strategies.  Although the options of antiarrhythmic therapy and/or an ablation procedure were discussed, as the patient has been asymptomatic of the atrial fibrillation, I have not recommended that either of these options be pursued at this juncture.  I have reviewed the findings of the blood test reports of 11/18/2022 and 10/10/2023 in detail with the patient today, and I have instructed her to continue Lipitor at a dosage of 20 mg daily for the time being.  She anticipates having follow-up blood testing performed in the near future with Dr. Noble for reassessment.  I have again explained to the patient that although her echocardiogram has suggested the presence of significant pulmonary hypertension, right heart catheterization performed for further evaluation on 8/1/17 revealed a normal pulmonary artery systolic pressure and a normal pulmonary capillary wedge pressure.  The patient will be following up with the neurologist regarding management of her seizure disorder.  I have asked the patient to call me if she should have any questions or problems pertaining to these matters, and especially if she should experience any concerning symptoms. I have otherwise asked her to return to the office in 6 months for follow-up cardiac evaluation and blood pressure reassessment, provided she remains clinically stable in the interim. [EKG obtained to assist in diagnosis and management of assessed problem(s)] : EKG obtained to assist in diagnosis and management of assessed problem(s)

## 2024-06-11 NOTE — PHYSICAL EXAM
[General Appearance - Well Developed] : well developed [General Appearance - In No Acute Distress] : no acute distress [Normal Conjunctiva] : the conjunctiva exhibited no abnormalities [No Oral Pallor] : no oral pallor [Respiration, Rhythm And Depth] : normal respiratory rhythm and effort [Auscultation Breath Sounds / Voice Sounds] : lungs were clear to auscultation bilaterally [Bowel Sounds] : normal bowel sounds [Abdomen Tenderness] : non-tender [Abnormal Walk] : normal gait [Cyanosis, Localized] : no localized cyanosis [] : no rash [Oriented To Time, Place, And Person] : oriented to person, place, and time [Not Palpable] : not palpable [No Precordial Heave] : no precordial heave was noted [Bradycardia] : bradycardic [Premature Beats] : regular with premature beats [Normal S1] : normal S1 [Normal S2] : normal S2 [No Gallop] : no gallop heard [I] : a grade 1 [2+] : left 2+ [No Abnormalities] : the abdominal aorta was not enlarged and no bruit was heard [No Pitting Edema] : no pitting edema present [FreeTextEntry1] : no JVD is appreciated at a 45 angle [Apical Thrill] : no thrill palpable at the apex [Click] : no click [Pericardial Rub] : no pericardial rub [Right Carotid Bruit] : no bruit heard over the right carotid [Left Carotid Bruit] : no bruit heard over the left carotid

## 2024-06-11 NOTE — CARDIOLOGY SUMMARY
[de-identified] : 6/11/24 -atrial fibrillation with an average ventricular rate of 75 bpm.  Nonspecific diminished voltage in leads V3-V6.

## 2024-06-18 RX ORDER — AMLODIPINE BESYLATE 5 MG/1
5 TABLET ORAL
Qty: 180 | Refills: 2 | Status: ACTIVE | COMMUNITY
Start: 2018-02-28

## 2024-06-20 ENCOUNTER — INPATIENT (INPATIENT)
Facility: HOSPITAL | Age: 84
LOS: 0 days | Discharge: HOME | DRG: 93 | End: 2024-06-21
Attending: STUDENT IN AN ORGANIZED HEALTH CARE EDUCATION/TRAINING PROGRAM | Admitting: STUDENT IN AN ORGANIZED HEALTH CARE EDUCATION/TRAINING PROGRAM
Payer: MEDICARE

## 2024-06-20 ENCOUNTER — OUTPATIENT (OUTPATIENT)
Dept: OUTPATIENT SERVICES | Facility: HOSPITAL | Age: 84
LOS: 1 days | End: 2024-06-20
Payer: COMMERCIAL

## 2024-06-20 VITALS
HEIGHT: 62 IN | DIASTOLIC BLOOD PRESSURE: 86 MMHG | HEART RATE: 85 BPM | WEIGHT: 113.1 LBS | OXYGEN SATURATION: 100 % | SYSTOLIC BLOOD PRESSURE: 142 MMHG | TEMPERATURE: 98 F | RESPIRATION RATE: 18 BRPM

## 2024-06-20 DIAGNOSIS — Z90.89 ACQUIRED ABSENCE OF OTHER ORGANS: Chronic | ICD-10-CM

## 2024-06-20 DIAGNOSIS — Z90.49 ACQUIRED ABSENCE OF OTHER SPECIFIED PARTS OF DIGESTIVE TRACT: Chronic | ICD-10-CM

## 2024-06-20 DIAGNOSIS — R27.0 ATAXIA, UNSPECIFIED: ICD-10-CM

## 2024-06-20 DIAGNOSIS — Z98.890 OTHER SPECIFIED POSTPROCEDURAL STATES: Chronic | ICD-10-CM

## 2024-06-20 LAB
ALBUMIN SERPL ELPH-MCNC: 3.7 G/DL — SIGNIFICANT CHANGE UP (ref 3.3–5)
ALP SERPL-CCNC: 147 U/L — HIGH (ref 30–120)
ALT FLD-CCNC: 41 U/L — SIGNIFICANT CHANGE UP (ref 10–60)
ANION GAP SERPL CALC-SCNC: 7 MMOL/L — SIGNIFICANT CHANGE UP (ref 5–17)
APPEARANCE UR: CLEAR — SIGNIFICANT CHANGE UP
AST SERPL-CCNC: 34 U/L — SIGNIFICANT CHANGE UP (ref 10–40)
BASOPHILS # BLD AUTO: 0.05 K/UL — SIGNIFICANT CHANGE UP (ref 0–0.2)
BASOPHILS NFR BLD AUTO: 0.5 % — SIGNIFICANT CHANGE UP (ref 0–2)
BILIRUB SERPL-MCNC: 1 MG/DL — SIGNIFICANT CHANGE UP (ref 0.2–1.2)
BILIRUB UR-MCNC: NEGATIVE — SIGNIFICANT CHANGE UP
BUN SERPL-MCNC: 21 MG/DL — SIGNIFICANT CHANGE UP (ref 7–23)
CALCIUM SERPL-MCNC: 9.7 MG/DL — SIGNIFICANT CHANGE UP (ref 8.4–10.5)
CHLORIDE SERPL-SCNC: 105 MMOL/L — SIGNIFICANT CHANGE UP (ref 96–108)
CO2 SERPL-SCNC: 27 MMOL/L — SIGNIFICANT CHANGE UP (ref 22–31)
COLOR SPEC: YELLOW — SIGNIFICANT CHANGE UP
CREAT SERPL-MCNC: 0.94 MG/DL — SIGNIFICANT CHANGE UP (ref 0.5–1.3)
DIFF PNL FLD: ABNORMAL
EGFR: 60 ML/MIN/1.73M2 — SIGNIFICANT CHANGE UP
EOSINOPHIL # BLD AUTO: 0.39 K/UL — SIGNIFICANT CHANGE UP (ref 0–0.5)
EOSINOPHIL NFR BLD AUTO: 4 % — SIGNIFICANT CHANGE UP (ref 0–6)
GLUCOSE SERPL-MCNC: 112 MG/DL — HIGH (ref 70–99)
GLUCOSE UR QL: NEGATIVE MG/DL — SIGNIFICANT CHANGE UP
HCT VFR BLD CALC: 42 % — SIGNIFICANT CHANGE UP (ref 34.5–45)
HGB BLD-MCNC: 13.3 G/DL — SIGNIFICANT CHANGE UP (ref 11.5–15.5)
IMM GRANULOCYTES NFR BLD AUTO: 0.2 % — SIGNIFICANT CHANGE UP (ref 0–0.9)
KETONES UR-MCNC: NEGATIVE MG/DL — SIGNIFICANT CHANGE UP
LEUKOCYTE ESTERASE UR-ACNC: ABNORMAL
LYMPHOCYTES # BLD AUTO: 2.32 K/UL — SIGNIFICANT CHANGE UP (ref 1–3.3)
LYMPHOCYTES # BLD AUTO: 23.8 % — SIGNIFICANT CHANGE UP (ref 13–44)
MCHC RBC-ENTMCNC: 29.6 PG — SIGNIFICANT CHANGE UP (ref 27–34)
MCHC RBC-ENTMCNC: 31.7 GM/DL — LOW (ref 32–36)
MCV RBC AUTO: 93.3 FL — SIGNIFICANT CHANGE UP (ref 80–100)
MONOCYTES # BLD AUTO: 0.96 K/UL — HIGH (ref 0–0.9)
MONOCYTES NFR BLD AUTO: 9.9 % — SIGNIFICANT CHANGE UP (ref 2–14)
NEUTROPHILS # BLD AUTO: 5.99 K/UL — SIGNIFICANT CHANGE UP (ref 1.8–7.4)
NEUTROPHILS NFR BLD AUTO: 61.6 % — SIGNIFICANT CHANGE UP (ref 43–77)
NITRITE UR-MCNC: NEGATIVE — SIGNIFICANT CHANGE UP
NRBC # BLD: 0 /100 WBCS — SIGNIFICANT CHANGE UP (ref 0–0)
NT-PROBNP SERPL-SCNC: 1332 PG/ML — HIGH (ref 0–450)
PH UR: 7.5 — SIGNIFICANT CHANGE UP (ref 5–8)
PLATELET # BLD AUTO: 227 K/UL — SIGNIFICANT CHANGE UP (ref 150–400)
POTASSIUM SERPL-MCNC: 4.3 MMOL/L — SIGNIFICANT CHANGE UP (ref 3.5–5.3)
POTASSIUM SERPL-SCNC: 4.3 MMOL/L — SIGNIFICANT CHANGE UP (ref 3.5–5.3)
PROT SERPL-MCNC: 7.5 G/DL — SIGNIFICANT CHANGE UP (ref 6–8.3)
PROT UR-MCNC: NEGATIVE MG/DL — SIGNIFICANT CHANGE UP
RBC # BLD: 4.5 M/UL — SIGNIFICANT CHANGE UP (ref 3.8–5.2)
RBC # FLD: 12.8 % — SIGNIFICANT CHANGE UP (ref 10.3–14.5)
SODIUM SERPL-SCNC: 139 MMOL/L — SIGNIFICANT CHANGE UP (ref 135–145)
SP GR SPEC: 1.01 — SIGNIFICANT CHANGE UP (ref 1–1.03)
TROPONIN I, HIGH SENSITIVITY RESULT: 7.2 NG/L — SIGNIFICANT CHANGE UP
UROBILINOGEN FLD QL: 0.2 MG/DL — SIGNIFICANT CHANGE UP (ref 0.2–1)
WBC # BLD: 9.73 K/UL — SIGNIFICANT CHANGE UP (ref 3.8–10.5)
WBC # FLD AUTO: 9.73 K/UL — SIGNIFICANT CHANGE UP (ref 3.8–10.5)

## 2024-06-20 PROCEDURE — 99285 EMERGENCY DEPT VISIT HI MDM: CPT

## 2024-06-20 PROCEDURE — 70498 CT ANGIOGRAPHY NECK: CPT | Mod: 26,MC

## 2024-06-20 PROCEDURE — 70551 MRI BRAIN STEM W/O DYE: CPT

## 2024-06-20 PROCEDURE — 99222 1ST HOSP IP/OBS MODERATE 55: CPT

## 2024-06-20 PROCEDURE — 93010 ELECTROCARDIOGRAM REPORT: CPT

## 2024-06-20 PROCEDURE — 95816 EEG AWAKE AND DROWSY: CPT

## 2024-06-20 PROCEDURE — 70496 CT ANGIOGRAPHY HEAD: CPT | Mod: 26,MC

## 2024-06-20 PROCEDURE — 70450 CT HEAD/BRAIN W/O DYE: CPT | Mod: 26,59,MC

## 2024-06-20 PROCEDURE — 71045 X-RAY EXAM CHEST 1 VIEW: CPT | Mod: 26

## 2024-06-20 RX ORDER — APIXABAN 2.5 MG/1
0.5 TABLET, FILM COATED ORAL
Refills: 0 | DISCHARGE

## 2024-06-20 RX ORDER — METOPROLOL TARTRATE 50 MG
25 TABLET ORAL DAILY
Refills: 0 | Status: DISCONTINUED | OUTPATIENT
Start: 2024-06-20 | End: 2024-06-21

## 2024-06-20 RX ORDER — ASPIRIN 325 MG/1
81 TABLET, FILM COATED ORAL DAILY
Refills: 0 | Status: DISCONTINUED | OUTPATIENT
Start: 2024-06-20 | End: 2024-06-21

## 2024-06-20 RX ORDER — LAMOTRIGINE 25 MG/1
2 TABLET, ORALLY DISINTEGRATING ORAL
Qty: 0 | Refills: 0 | DISCHARGE

## 2024-06-20 RX ORDER — ATORVASTATIN CALCIUM 20 MG/1
40 TABLET, FILM COATED ORAL AT BEDTIME
Refills: 0 | Status: DISCONTINUED | OUTPATIENT
Start: 2024-06-20 | End: 2024-06-21

## 2024-06-20 RX ORDER — ASCORBIC ACID 60 MG
2 TABLET,CHEWABLE ORAL
Refills: 0 | DISCHARGE

## 2024-06-20 RX ORDER — APIXABAN 5 MG/1
2.5 TABLET, FILM COATED ORAL EVERY 12 HOURS
Refills: 0 | Status: DISCONTINUED | OUTPATIENT
Start: 2024-06-20 | End: 2024-06-21

## 2024-06-20 RX ORDER — ASPIRIN 325 MG/1
325 TABLET, FILM COATED ORAL ONCE
Refills: 0 | Status: COMPLETED | OUTPATIENT
Start: 2024-06-20 | End: 2024-06-20

## 2024-06-20 RX ORDER — LOSARTAN POTASSIUM 100 MG/1
1 TABLET, FILM COATED ORAL
Qty: 0 | Refills: 0 | DISCHARGE

## 2024-06-20 RX ORDER — LANOLIN ALCOHOL/MO/W.PET/CERES
1 CREAM (GRAM) TOPICAL
Refills: 0 | DISCHARGE

## 2024-06-20 RX ORDER — METOPROLOL TARTRATE 50 MG
1 TABLET ORAL
Qty: 0 | Refills: 0 | DISCHARGE

## 2024-06-20 RX ORDER — ACETAMINOPHEN 500 MG
2 TABLET ORAL
Refills: 0 | DISCHARGE

## 2024-06-20 RX ORDER — LAMOTRIGINE 100 MG/1
50 TABLET ORAL
Refills: 0 | Status: DISCONTINUED | OUTPATIENT
Start: 2024-06-20 | End: 2024-06-21

## 2024-06-20 RX ADMIN — ASPIRIN 325 MILLIGRAM(S): 325 TABLET, FILM COATED ORAL at 06:48

## 2024-06-20 RX ADMIN — Medication 400 MILLIGRAM(S): at 21:44

## 2024-06-20 RX ADMIN — APIXABAN 2.5 MILLIGRAM(S): 5 TABLET, FILM COATED ORAL at 17:38

## 2024-06-20 RX ADMIN — Medication 25 MILLIGRAM(S): at 17:37

## 2024-06-20 RX ADMIN — LAMOTRIGINE 50 MILLIGRAM(S): 100 TABLET ORAL at 17:38

## 2024-06-20 RX ADMIN — Medication 400 MILLIGRAM(S): at 22:24

## 2024-06-20 RX ADMIN — Medication 1 TABLET(S): at 14:00

## 2024-06-20 RX ADMIN — Medication 5 MILLIGRAM(S): at 23:37

## 2024-06-21 ENCOUNTER — NON-APPOINTMENT (OUTPATIENT)
Age: 84
End: 2024-06-21

## 2024-06-21 ENCOUNTER — TRANSCRIPTION ENCOUNTER (OUTPATIENT)
Age: 84
End: 2024-06-21

## 2024-06-21 VITALS
DIASTOLIC BLOOD PRESSURE: 69 MMHG | HEART RATE: 76 BPM | RESPIRATION RATE: 20 BRPM | SYSTOLIC BLOOD PRESSURE: 141 MMHG | TEMPERATURE: 97 F | OXYGEN SATURATION: 97 %

## 2024-06-21 LAB
A1C WITH ESTIMATED AVERAGE GLUCOSE RESULT: 5.9 % — HIGH (ref 4–5.6)
ANION GAP SERPL CALC-SCNC: 7 MMOL/L — SIGNIFICANT CHANGE UP (ref 5–17)
BUN SERPL-MCNC: 17 MG/DL — SIGNIFICANT CHANGE UP (ref 7–23)
CALCIUM SERPL-MCNC: 9.3 MG/DL — SIGNIFICANT CHANGE UP (ref 8.4–10.5)
CHLORIDE SERPL-SCNC: 108 MMOL/L — SIGNIFICANT CHANGE UP (ref 96–108)
CHOLEST SERPL-MCNC: 158 MG/DL — SIGNIFICANT CHANGE UP
CO2 SERPL-SCNC: 24 MMOL/L — SIGNIFICANT CHANGE UP (ref 22–31)
CREAT SERPL-MCNC: 0.79 MG/DL — SIGNIFICANT CHANGE UP (ref 0.5–1.3)
EGFR: 74 ML/MIN/1.73M2 — SIGNIFICANT CHANGE UP
ESTIMATED AVERAGE GLUCOSE: 123 MG/DL — HIGH (ref 68–114)
GLUCOSE SERPL-MCNC: 104 MG/DL — HIGH (ref 70–99)
HDLC SERPL-MCNC: 85 MG/DL — SIGNIFICANT CHANGE UP
LIPID PNL WITH DIRECT LDL SERPL: 63 MG/DL — SIGNIFICANT CHANGE UP
NON HDL CHOLESTEROL: 73 MG/DL — SIGNIFICANT CHANGE UP
POTASSIUM SERPL-MCNC: 3.8 MMOL/L — SIGNIFICANT CHANGE UP (ref 3.5–5.3)
POTASSIUM SERPL-SCNC: 3.8 MMOL/L — SIGNIFICANT CHANGE UP (ref 3.5–5.3)
SODIUM SERPL-SCNC: 139 MMOL/L — SIGNIFICANT CHANGE UP (ref 135–145)
TRIGL SERPL-MCNC: 46 MG/DL — SIGNIFICANT CHANGE UP

## 2024-06-21 PROCEDURE — 99239 HOSP IP/OBS DSCHRG MGMT >30: CPT

## 2024-06-21 PROCEDURE — 71045 X-RAY EXAM CHEST 1 VIEW: CPT

## 2024-06-21 PROCEDURE — 84484 ASSAY OF TROPONIN QUANT: CPT

## 2024-06-21 PROCEDURE — 70498 CT ANGIOGRAPHY NECK: CPT | Mod: MC

## 2024-06-21 PROCEDURE — 80061 LIPID PANEL: CPT

## 2024-06-21 PROCEDURE — 85025 COMPLETE CBC W/AUTO DIFF WBC: CPT

## 2024-06-21 PROCEDURE — 93005 ELECTROCARDIOGRAM TRACING: CPT

## 2024-06-21 PROCEDURE — 70496 CT ANGIOGRAPHY HEAD: CPT | Mod: MC

## 2024-06-21 PROCEDURE — 80048 BASIC METABOLIC PNL TOTAL CA: CPT

## 2024-06-21 PROCEDURE — 70450 CT HEAD/BRAIN W/O DYE: CPT | Mod: MC

## 2024-06-21 PROCEDURE — 36415 COLL VENOUS BLD VENIPUNCTURE: CPT

## 2024-06-21 PROCEDURE — 80053 COMPREHEN METABOLIC PANEL: CPT

## 2024-06-21 PROCEDURE — 81001 URINALYSIS AUTO W/SCOPE: CPT

## 2024-06-21 PROCEDURE — 97161 PT EVAL LOW COMPLEX 20 MIN: CPT

## 2024-06-21 PROCEDURE — 99285 EMERGENCY DEPT VISIT HI MDM: CPT

## 2024-06-21 PROCEDURE — 83880 ASSAY OF NATRIURETIC PEPTIDE: CPT

## 2024-06-21 PROCEDURE — 83036 HEMOGLOBIN GLYCOSYLATED A1C: CPT

## 2024-06-21 RX ORDER — AMLODIPINE BESYLATE 2.5 MG/1
1 TABLET ORAL
Refills: 0 | DISCHARGE

## 2024-06-21 RX ORDER — ATORVASTATIN CALCIUM 20 MG/1
1 TABLET, FILM COATED ORAL
Qty: 30 | Refills: 0
Start: 2024-06-21 | End: 2024-07-20

## 2024-06-21 RX ORDER — ASPIRIN 325 MG/1
1 TABLET, FILM COATED ORAL
Qty: 30 | Refills: 0
Start: 2024-06-21 | End: 2024-07-20

## 2024-06-21 RX ORDER — ATORVASTATIN CALCIUM 80 MG/1
1 TABLET, FILM COATED ORAL
Qty: 0 | Refills: 0 | DISCHARGE

## 2024-06-21 RX ADMIN — LAMOTRIGINE 50 MILLIGRAM(S): 100 TABLET ORAL at 06:08

## 2024-06-21 RX ADMIN — Medication 1 TABLET(S): at 06:09

## 2024-06-21 RX ADMIN — Medication 25 MILLIGRAM(S): at 06:09

## 2024-06-21 RX ADMIN — ASPIRIN 81 MILLIGRAM(S): 325 TABLET, FILM COATED ORAL at 12:58

## 2024-06-21 RX ADMIN — APIXABAN 2.5 MILLIGRAM(S): 5 TABLET, FILM COATED ORAL at 06:09

## 2024-06-24 ENCOUNTER — NON-APPOINTMENT (OUTPATIENT)
Age: 84
End: 2024-06-24

## 2024-06-27 ENCOUNTER — APPOINTMENT (OUTPATIENT)
Dept: CARDIOLOGY | Facility: CLINIC | Age: 84
End: 2024-06-27
Payer: MEDICARE

## 2024-06-27 PROCEDURE — 93306 TTE W/DOPPLER COMPLETE: CPT

## 2024-06-27 PROCEDURE — 93880 EXTRACRANIAL BILAT STUDY: CPT

## 2024-07-01 ENCOUNTER — APPOINTMENT (OUTPATIENT)
Dept: CARDIOLOGY | Facility: CLINIC | Age: 84
End: 2024-07-01
Payer: MEDICARE

## 2024-07-01 VITALS
HEIGHT: 62 IN | WEIGHT: 115 LBS | HEART RATE: 81 BPM | OXYGEN SATURATION: 96 % | SYSTOLIC BLOOD PRESSURE: 118 MMHG | BODY MASS INDEX: 21.16 KG/M2 | DIASTOLIC BLOOD PRESSURE: 67 MMHG

## 2024-07-01 DIAGNOSIS — I65.23 OCCLUSION AND STENOSIS OF BILATERAL CAROTID ARTERIES: ICD-10-CM

## 2024-07-01 DIAGNOSIS — I10 ESSENTIAL (PRIMARY) HYPERTENSION: ICD-10-CM

## 2024-07-01 PROBLEM — I48.0 PAROXYSMAL ATRIAL FIBRILLATION: Status: ACTIVE | Noted: 2017-12-06

## 2024-07-01 PROCEDURE — 93000 ELECTROCARDIOGRAM COMPLETE: CPT

## 2024-07-01 PROCEDURE — 99215 OFFICE O/P EST HI 40 MIN: CPT

## 2024-07-01 PROCEDURE — G2211 COMPLEX E/M VISIT ADD ON: CPT

## 2024-07-15 RX ORDER — FUROSEMIDE 20 MG/1
20 TABLET ORAL
Qty: 5 | Refills: 3 | Status: ACTIVE | COMMUNITY
Start: 2024-07-15

## 2024-07-18 ENCOUNTER — APPOINTMENT (OUTPATIENT)
Dept: INTERNAL MEDICINE | Facility: CLINIC | Age: 84
End: 2024-07-18
Payer: MEDICARE

## 2024-07-18 VITALS
HEART RATE: 85 BPM | TEMPERATURE: 97.8 F | DIASTOLIC BLOOD PRESSURE: 60 MMHG | RESPIRATION RATE: 14 BRPM | SYSTOLIC BLOOD PRESSURE: 120 MMHG | BODY MASS INDEX: 21.16 KG/M2 | WEIGHT: 115 LBS | HEIGHT: 62 IN | OXYGEN SATURATION: 98 %

## 2024-07-18 DIAGNOSIS — I48.0 PAROXYSMAL ATRIAL FIBRILLATION: ICD-10-CM

## 2024-07-18 DIAGNOSIS — Z00.00 ENCOUNTER FOR GENERAL ADULT MEDICAL EXAMINATION W/OUT ABNORMAL FINDINGS: ICD-10-CM

## 2024-07-18 PROCEDURE — 90677 PCV20 VACCINE IM: CPT

## 2024-07-18 PROCEDURE — G0439: CPT

## 2024-07-18 PROCEDURE — G0009: CPT

## 2024-07-18 NOTE — ASU PATIENT PROFILE, ADULT - FALL HARM RISK - FACTORS NURSING JUDGEMENT
HCC coding opportunities       Chart reviewed, no opportunity found: CHART REVIEWED, NO OPPORTUNITY FOUND        Patients Insurance        Commercial Insurance: [employer]       
No

## 2024-07-19 LAB
25(OH)D3 SERPL-MCNC: 35.6 NG/ML
ALBUMIN SERPL ELPH-MCNC: 4.6 G/DL
ALP BLD-CCNC: 150 U/L
ALT SERPL-CCNC: 31 U/L
ANION GAP SERPL CALC-SCNC: 12 MMOL/L
AST SERPL-CCNC: 26 U/L
BASOPHILS # BLD AUTO: 0.08 K/UL
BASOPHILS NFR BLD AUTO: 0.9 %
BILIRUB SERPL-MCNC: 0.9 MG/DL
BUN SERPL-MCNC: 16 MG/DL
CALCIUM SERPL-MCNC: 10.4 MG/DL
CHLORIDE SERPL-SCNC: 101 MMOL/L
CHOLEST SERPL-MCNC: 213 MG/DL
CO2 SERPL-SCNC: 27 MMOL/L
CREAT SERPL-MCNC: 0.82 MG/DL
EGFR: 70 ML/MIN/1.73M2
EOSINOPHIL # BLD AUTO: 0.64 K/UL
EOSINOPHIL NFR BLD AUTO: 6.9 %
ESTIMATED AVERAGE GLUCOSE: 126 MG/DL
FOLATE SERPL-MCNC: >20 NG/ML
GLUCOSE SERPL-MCNC: 100 MG/DL
HBA1C MFR BLD HPLC: 6 %
HCT VFR BLD CALC: 42.4 %
HDLC SERPL-MCNC: 85 MG/DL
HGB BLD-MCNC: 13.3 G/DL
IMM GRANULOCYTES NFR BLD AUTO: 0.3 %
LDLC SERPL CALC-MCNC: 119 MG/DL
LYMPHOCYTES # BLD AUTO: 2.17 K/UL
LYMPHOCYTES NFR BLD AUTO: 23.4 %
MAN DIFF?: NORMAL
MCHC RBC-ENTMCNC: 30.1 PG
MCHC RBC-ENTMCNC: 31.4 GM/DL
MCV RBC AUTO: 95.9 FL
MONOCYTES # BLD AUTO: 0.8 K/UL
MONOCYTES NFR BLD AUTO: 8.6 %
NEUTROPHILS # BLD AUTO: 5.57 K/UL
NEUTROPHILS NFR BLD AUTO: 59.9 %
NONHDLC SERPL-MCNC: 128 MG/DL
PLATELET # BLD AUTO: 239 K/UL
POTASSIUM SERPL-SCNC: 4.7 MMOL/L
PROT SERPL-MCNC: 7.1 G/DL
RBC # BLD: 4.42 M/UL
RBC # FLD: 12.8 %
SODIUM SERPL-SCNC: 141 MMOL/L
TRIGL SERPL-MCNC: 50 MG/DL
TSH SERPL-ACNC: 1.3 UIU/ML
VIT B12 SERPL-MCNC: 881 PG/ML
WBC # FLD AUTO: 9.29 K/UL

## 2024-07-31 ENCOUNTER — EMERGENCY (EMERGENCY)
Facility: HOSPITAL | Age: 84
LOS: 1 days | Discharge: HOME | End: 2024-07-31
Attending: EMERGENCY MEDICINE | Admitting: EMERGENCY MEDICINE
Payer: MEDICARE

## 2024-07-31 VITALS
TEMPERATURE: 98 F | HEIGHT: 62 IN | SYSTOLIC BLOOD PRESSURE: 191 MMHG | WEIGHT: 113.1 LBS | HEART RATE: 91 BPM | RESPIRATION RATE: 18 BRPM | DIASTOLIC BLOOD PRESSURE: 78 MMHG | OXYGEN SATURATION: 99 %

## 2024-07-31 VITALS
RESPIRATION RATE: 18 BRPM | HEART RATE: 86 BPM | TEMPERATURE: 98 F | DIASTOLIC BLOOD PRESSURE: 74 MMHG | SYSTOLIC BLOOD PRESSURE: 155 MMHG | OXYGEN SATURATION: 98 %

## 2024-07-31 DIAGNOSIS — Z98.890 OTHER SPECIFIED POSTPROCEDURAL STATES: Chronic | ICD-10-CM

## 2024-07-31 DIAGNOSIS — Z90.49 ACQUIRED ABSENCE OF OTHER SPECIFIED PARTS OF DIGESTIVE TRACT: Chronic | ICD-10-CM

## 2024-07-31 DIAGNOSIS — Z90.89 ACQUIRED ABSENCE OF OTHER ORGANS: Chronic | ICD-10-CM

## 2024-07-31 LAB
ALBUMIN SERPL ELPH-MCNC: 3.8 G/DL — SIGNIFICANT CHANGE UP (ref 3.3–5)
ALP SERPL-CCNC: 132 U/L — HIGH (ref 40–120)
ALT FLD-CCNC: 31 U/L — SIGNIFICANT CHANGE UP (ref 12–78)
ANION GAP SERPL CALC-SCNC: 7 MMOL/L — SIGNIFICANT CHANGE UP (ref 5–17)
AST SERPL-CCNC: 28 U/L — SIGNIFICANT CHANGE UP (ref 15–37)
BASOPHILS # BLD AUTO: 0.05 K/UL — SIGNIFICANT CHANGE UP (ref 0–0.2)
BASOPHILS NFR BLD AUTO: 0.5 % — SIGNIFICANT CHANGE UP (ref 0–2)
BILIRUB SERPL-MCNC: 1 MG/DL — SIGNIFICANT CHANGE UP (ref 0.2–1.2)
BUN SERPL-MCNC: 17 MG/DL — SIGNIFICANT CHANGE UP (ref 7–23)
CALCIUM SERPL-MCNC: 10.2 MG/DL — HIGH (ref 8.5–10.1)
CHLORIDE SERPL-SCNC: 106 MMOL/L — SIGNIFICANT CHANGE UP (ref 96–108)
CO2 SERPL-SCNC: 27 MMOL/L — SIGNIFICANT CHANGE UP (ref 22–31)
CREAT SERPL-MCNC: 0.89 MG/DL — SIGNIFICANT CHANGE UP (ref 0.5–1.3)
EGFR: 64 ML/MIN/1.73M2 — SIGNIFICANT CHANGE UP
EOSINOPHIL # BLD AUTO: 0.64 K/UL — HIGH (ref 0–0.5)
EOSINOPHIL NFR BLD AUTO: 6.9 % — HIGH (ref 0–6)
GLUCOSE SERPL-MCNC: 111 MG/DL — HIGH (ref 70–99)
HCT VFR BLD CALC: 43.4 % — SIGNIFICANT CHANGE UP (ref 34.5–45)
HGB BLD-MCNC: 14 G/DL — SIGNIFICANT CHANGE UP (ref 11.5–15.5)
IMM GRANULOCYTES NFR BLD AUTO: 0.3 % — SIGNIFICANT CHANGE UP (ref 0–0.9)
LYMPHOCYTES # BLD AUTO: 1.8 K/UL — SIGNIFICANT CHANGE UP (ref 1–3.3)
LYMPHOCYTES # BLD AUTO: 19.4 % — SIGNIFICANT CHANGE UP (ref 13–44)
MCHC RBC-ENTMCNC: 29.9 PG — SIGNIFICANT CHANGE UP (ref 27–34)
MCHC RBC-ENTMCNC: 32.3 GM/DL — SIGNIFICANT CHANGE UP (ref 32–36)
MCV RBC AUTO: 92.5 FL — SIGNIFICANT CHANGE UP (ref 80–100)
MONOCYTES # BLD AUTO: 0.57 K/UL — SIGNIFICANT CHANGE UP (ref 0–0.9)
MONOCYTES NFR BLD AUTO: 6.1 % — SIGNIFICANT CHANGE UP (ref 2–14)
NEUTROPHILS # BLD AUTO: 6.19 K/UL — SIGNIFICANT CHANGE UP (ref 1.8–7.4)
NEUTROPHILS NFR BLD AUTO: 66.8 % — SIGNIFICANT CHANGE UP (ref 43–77)
NRBC # BLD: 0 /100 WBCS — SIGNIFICANT CHANGE UP (ref 0–0)
PLATELET # BLD AUTO: 235 K/UL — SIGNIFICANT CHANGE UP (ref 150–400)
POTASSIUM SERPL-MCNC: 4.2 MMOL/L — SIGNIFICANT CHANGE UP (ref 3.5–5.3)
POTASSIUM SERPL-SCNC: 4.2 MMOL/L — SIGNIFICANT CHANGE UP (ref 3.5–5.3)
PROT SERPL-MCNC: 7.4 G/DL — SIGNIFICANT CHANGE UP (ref 6–8.3)
RBC # BLD: 4.69 M/UL — SIGNIFICANT CHANGE UP (ref 3.8–5.2)
RBC # FLD: 12.7 % — SIGNIFICANT CHANGE UP (ref 10.3–14.5)
SARS-COV-2 RNA SPEC QL NAA+PROBE: SIGNIFICANT CHANGE UP
SODIUM SERPL-SCNC: 140 MMOL/L — SIGNIFICANT CHANGE UP (ref 135–145)
TROPONIN I, HIGH SENSITIVITY RESULT: 8.8 NG/L — SIGNIFICANT CHANGE UP
WBC # BLD: 9.28 K/UL — SIGNIFICANT CHANGE UP (ref 3.8–10.5)
WBC # FLD AUTO: 9.28 K/UL — SIGNIFICANT CHANGE UP (ref 3.8–10.5)

## 2024-07-31 PROCEDURE — 36415 COLL VENOUS BLD VENIPUNCTURE: CPT

## 2024-07-31 PROCEDURE — 93005 ELECTROCARDIOGRAM TRACING: CPT

## 2024-07-31 PROCEDURE — 85025 COMPLETE CBC W/AUTO DIFF WBC: CPT

## 2024-07-31 PROCEDURE — 80053 COMPREHEN METABOLIC PANEL: CPT

## 2024-07-31 PROCEDURE — 99285 EMERGENCY DEPT VISIT HI MDM: CPT

## 2024-07-31 PROCEDURE — 87635 SARS-COV-2 COVID-19 AMP PRB: CPT

## 2024-07-31 PROCEDURE — 99283 EMERGENCY DEPT VISIT LOW MDM: CPT

## 2024-07-31 PROCEDURE — 84484 ASSAY OF TROPONIN QUANT: CPT

## 2024-07-31 PROCEDURE — 93010 ELECTROCARDIOGRAM REPORT: CPT

## 2024-07-31 RX ORDER — LOSARTAN POTASSIUM 100 MG/1
1 TABLET, FILM COATED ORAL
Refills: 0 | DISCHARGE

## 2024-07-31 RX ORDER — APIXABAN 5 MG/1
1 TABLET, FILM COATED ORAL
Refills: 0 | DISCHARGE

## 2024-07-31 RX ORDER — AMLODIPINE BESYLATE 2.5 MG/1
1 TABLET ORAL
Refills: 0 | DISCHARGE

## 2024-07-31 NOTE — ED PROVIDER NOTE - OBJECTIVE STATEMENT
Patient states that she woke up at 5:30 in the morning and felt a little shaky and dizzy so she took her blood pressure and it was 180 systolic, patient then took her morning medications which included losartan 100 mg metoprolol 25 mg and then took her blood pressure again and it was 190 systolic so patient began to worry and comes to the ER.  Patient currently has no specific complaints except for having the high blood pressure.  Patient has a history of hypertension, atrial fibrillation and seizure disorder.

## 2024-07-31 NOTE — ED ADULT NURSE NOTE - NSICDXPASTMEDICALHX_GEN_ALL_CORE_FT
PAST MEDICAL HISTORY:  Atrial fibrillation     HLD (hyperlipidemia)     HTN (hypertension)     Seizure     Seizure disorder least episode 1.5 years ago

## 2024-07-31 NOTE — ED ADULT NURSE NOTE - OBJECTIVE STATEMENT
the patient presents to the er with complaints of feeling shaky and "off" and pain in the back of her head.  she denies any injury or trauma.  she states that she took her bp this am and it was elevated (190 systolic).  she took all of her htn medication but still felt "off".  called 911 and was brought here.  IV access obtained, labs collected, swab collected.  EKG performed, assisted to bathroom.  on cardiac monitor. the patient presents to the er with complaints of feeling shaky and "off" and pain in the back of her head.  she denies any injury or trauma.  she states that she took her bp this am and it was elevated (190 systolic).  she took all of her htn medication but still felt "off".  called 911 and was brought here.  IV access obtained, labs collected, swab collected.  EKG performed, assisted to bathroom.  on cardiac monitor (afib)

## 2024-07-31 NOTE — ED ADULT TRIAGE NOTE - CHIEF COMPLAINT QUOTE
Patient brought in by ambulance as reported patient woke up with high blood pressure and feeling shaky. Patient states had pressure at home @ 194/87 and took amlodipine prior to coming to ED. Denies chest pain/ SOB.

## 2024-07-31 NOTE — ED PROVIDER NOTE - NSICDXPASTMEDICALHX_GEN_ALL_CORE_FT
PAST MEDICAL HISTORY:  Atrial fibrillation     HLD (hyperlipidemia)     HTN (hypertension)     Seizure     Seizure disorder least episode 1.5 years ago     Sibling  Still living? Unknown  Family history of heart disease, Age at diagnosis: Age Unknown  Family history of colon cancer, Age at diagnosis: Age Unknown     Mother  Still living? Unknown  Family history of breast cancer in mother, Age at diagnosis: Age Unknown

## 2024-07-31 NOTE — ED PROVIDER NOTE - CLINICAL SUMMARY MEDICAL DECISION MAKING FREE TEXT BOX
pt with benign hypertension, presents worried about her BP, took her AM meds after check BP and BP in ER normalizing. pt with benign hypertension, presents worried about her BP, took her AM meds after check BP and BP in ER normalizing.  ED work up unremarkable, pt stable for dc and fu w her cardiologist for any medication adjustments for her BP.

## 2024-07-31 NOTE — ED ADULT NURSE NOTE - NSFALLHARMRISKINTERV_ED_ALL_ED
Assistance OOB with selected safe patient handling equipment if applicable/Assistance with ambulation/Communicate risk of Fall with Harm to all staff, patient, and family/Monitor gait and stability/Provide visual cue: red socks, yellow wristband, yellow gown, etc/Reinforce activity limits and safety measures with patient and family/Bed in lowest position, wheels locked, appropriate side rails in place/Call bell, personal items and telephone in reach/Instruct patient to call for assistance before getting out of bed/chair/stretcher/Non-slip footwear applied when patient is off stretcher/[place] to call system/Physically safe environment - no spills, clutter or unnecessary equipment/Purposeful Proactive Rounding/Room/bathroom lighting operational, light cord in reach

## 2024-07-31 NOTE — ED PROVIDER NOTE - NSFOLLOWUPINSTRUCTIONS_ED_ALL_ED_FT
-- You should update your primary care physician on your Emergency Department visit and follow up with them.  If you do not have a physician or have difficulty following up, please call: [phone] to obtain a [facility] doctor or specialist who can provide follow up.    -- Return to the ER for worsening or persistent symptoms, and/or ANY NEW OR CONCERNING SYMPTOMS.

## 2024-07-31 NOTE — ED PROVIDER NOTE - NSICDXPASTSURGICALHX_GEN_ALL_CORE_FT
PAST SURGICAL HISTORY:  H/O hernia repair     History of tonsillectomy     Status post cholecystectomy

## 2024-07-31 NOTE — ED ADULT NURSE REASSESSMENT NOTE - NS ED NURSE REASSESS COMMENT FT1
0920:  patient is d/c.  iv removed from lac.  site benign.  the patient was given all of her d/c papers and instructed to f/u with cardio / internist.  the patient ate breakfast.  she states she is feeling better.  Insisted upon ambulating out of the ed with her son.  she left the ed with all belongings.  vitals recorded.  if any worsening of symptoms develops, new symptoms develop etc, she is to return to the er.  patient / son verbalized understanding.  joseph guadarrama.

## 2024-08-21 ENCOUNTER — RX RENEWAL (OUTPATIENT)
Age: 84
End: 2024-08-21

## 2024-08-28 ENCOUNTER — RX RENEWAL (OUTPATIENT)
Age: 84
End: 2024-08-28

## 2024-08-28 RX ORDER — APIXABAN 2.5 MG/1
2.5 TABLET, FILM COATED ORAL
Qty: 180 | Refills: 0 | Status: ACTIVE | COMMUNITY
Start: 2024-08-28

## 2024-08-30 ENCOUNTER — NON-APPOINTMENT (OUTPATIENT)
Age: 84
End: 2024-08-30

## 2024-08-30 ENCOUNTER — APPOINTMENT (OUTPATIENT)
Dept: OPHTHALMOLOGY | Facility: CLINIC | Age: 84
End: 2024-08-30
Payer: MEDICARE

## 2024-08-30 PROCEDURE — 92133 CPTRZD OPH DX IMG PST SGM ON: CPT

## 2024-08-30 PROCEDURE — 92014 COMPRE OPH EXAM EST PT 1/>: CPT

## 2024-09-12 ENCOUNTER — RX RENEWAL (OUTPATIENT)
Age: 84
End: 2024-09-12

## 2024-09-17 NOTE — HISTORY OF PRESENT ILLNESS
Dr. Toure
[FreeTextEntry1] : 82 yo female presents for Renal cyst and Hydronephrosis. \par Has been having off and on suprapubic pain, 6/10. \par Had Renal and Bladder Ultrasound, found to have renal cyst and hydronephrosis. \par No history of kidney stone. \par Daytime frequency is every 2-4 hours or so. Nocturia of 2-3 x. \par Endorses off and on dysuria. \par Denies hematuria, fever, chills or rigors.\par Non smoker. \par Has been having small hard bowel movements. \par

## 2024-09-20 ENCOUNTER — APPOINTMENT (OUTPATIENT)
Dept: ORTHOPEDIC SURGERY | Facility: CLINIC | Age: 84
End: 2024-09-20
Payer: MEDICARE

## 2024-09-20 VITALS — WEIGHT: 115 LBS | HEIGHT: 62 IN | BODY MASS INDEX: 21.16 KG/M2

## 2024-09-20 DIAGNOSIS — G56.02 CARPAL TUNNEL SYNDROME, LEFT UPPER LIMB: ICD-10-CM

## 2024-09-20 PROCEDURE — 20526 THER INJECTION CARP TUNNEL: CPT | Mod: LT

## 2024-09-20 PROCEDURE — 99213 OFFICE O/P EST LOW 20 MIN: CPT | Mod: 25

## 2024-09-20 PROCEDURE — J3490M: CUSTOM | Mod: NC,JZ

## 2024-09-20 NOTE — ASSESSMENT
[FreeTextEntry1] : L carpal tunnel injection was performed because of numbness and tingling Anesthesia: ethyl chloride sprayed topically Celestone 6mg: An injection of Celestone 1cc Lidocaine: An injection of Lidocaine 1% 1cc Marcaine: An injection of Marcaine 0.5% 1cc   The risks, benefits, and alternatives to cortisone injection were explained in full to the patient. Risks outlined include but are not limited to infection, sepsis, bleeding, scarring, skin discoloration, temporary increase in pain, syncopal episode, failure to resolve symptoms, allergic reaction, symptom recurrence, and elevation of blood sugar in diabetics. Patient understood the risks. All questions were answered. After discussion of options, patient verbally consented to an injection. Sterile prep was done of the injection site. Patient tolerated the procedure well. Advised to ice the injection site this evening.

## 2024-09-20 NOTE — PHYSICAL EXAM
[de-identified] : L hand:  Tender volar wrist  Good finger ROM  +Tinels  +Phalens  +Compression test  Decreased sensation median nerve distribution +thenar atrophy

## 2024-09-20 NOTE — HISTORY OF PRESENT ILLNESS
[5] : 5 [Dull/Aching] : dull/aching [Tingling] : tingling [de-identified] : L CTS Injection in May helped until now [FreeTextEntry1] : hands  [de-identified] : inj

## 2024-11-14 ENCOUNTER — APPOINTMENT (OUTPATIENT)
Dept: PAIN MANAGEMENT | Facility: CLINIC | Age: 84
End: 2024-11-14
Payer: MEDICARE

## 2024-11-14 DIAGNOSIS — M79.10 MYALGIA, UNSPECIFIED SITE: ICD-10-CM

## 2024-11-14 DIAGNOSIS — M47.816 SPONDYLOSIS W/OUT MYELOPATHY OR RADICULOPATHY, LUMBAR REGION: ICD-10-CM

## 2024-11-14 DIAGNOSIS — M54.16 RADICULOPATHY, LUMBAR REGION: ICD-10-CM

## 2024-11-14 DIAGNOSIS — M54.50 LOW BACK PAIN, UNSPECIFIED: ICD-10-CM

## 2024-11-14 DIAGNOSIS — M54.2 CERVICALGIA: ICD-10-CM

## 2024-11-14 PROCEDURE — 99213 OFFICE O/P EST LOW 20 MIN: CPT

## 2024-11-15 ENCOUNTER — APPOINTMENT (OUTPATIENT)
Dept: ORTHOPEDIC SURGERY | Facility: CLINIC | Age: 84
End: 2024-11-15
Payer: MEDICARE

## 2024-11-15 DIAGNOSIS — M25.551 PAIN IN RIGHT HIP: ICD-10-CM

## 2024-11-15 DIAGNOSIS — M47.816 SPONDYLOSIS W/OUT MYELOPATHY OR RADICULOPATHY, LUMBAR REGION: ICD-10-CM

## 2024-11-15 PROCEDURE — 99213 OFFICE O/P EST LOW 20 MIN: CPT

## 2024-11-15 PROCEDURE — 73502 X-RAY EXAM HIP UNI 2-3 VIEWS: CPT

## 2024-11-20 ENCOUNTER — APPOINTMENT (OUTPATIENT)
Dept: CARDIOLOGY | Facility: CLINIC | Age: 84
End: 2024-11-20
Payer: MEDICARE

## 2024-11-20 VITALS
OXYGEN SATURATION: 97 % | SYSTOLIC BLOOD PRESSURE: 127 MMHG | WEIGHT: 115 LBS | HEART RATE: 83 BPM | DIASTOLIC BLOOD PRESSURE: 72 MMHG | BODY MASS INDEX: 21.16 KG/M2 | HEIGHT: 62 IN

## 2024-11-20 DIAGNOSIS — I48.0 PAROXYSMAL ATRIAL FIBRILLATION: ICD-10-CM

## 2024-11-20 DIAGNOSIS — I35.1 NONRHEUMATIC AORTIC (VALVE) INSUFFICIENCY: ICD-10-CM

## 2024-11-20 DIAGNOSIS — I10 ESSENTIAL (PRIMARY) HYPERTENSION: ICD-10-CM

## 2024-11-20 DIAGNOSIS — E78.5 HYPERLIPIDEMIA, UNSPECIFIED: ICD-10-CM

## 2024-11-20 PROCEDURE — G2211 COMPLEX E/M VISIT ADD ON: CPT

## 2024-11-20 PROCEDURE — 93000 ELECTROCARDIOGRAM COMPLETE: CPT

## 2024-11-20 PROCEDURE — 99215 OFFICE O/P EST HI 40 MIN: CPT

## 2024-12-05 ENCOUNTER — APPOINTMENT (OUTPATIENT)
Dept: PAIN MANAGEMENT | Facility: CLINIC | Age: 84
End: 2024-12-05
Payer: MEDICARE

## 2024-12-05 DIAGNOSIS — M54.16 RADICULOPATHY, LUMBAR REGION: ICD-10-CM

## 2024-12-05 PROCEDURE — 62323 NJX INTERLAMINAR LMBR/SAC: CPT

## 2024-12-05 PROCEDURE — J3490M: CUSTOM

## 2024-12-09 ENCOUNTER — RX RENEWAL (OUTPATIENT)
Age: 84
End: 2024-12-09

## 2024-12-27 ENCOUNTER — RX RENEWAL (OUTPATIENT)
Age: 84
End: 2024-12-27

## 2025-03-05 ENCOUNTER — RX RENEWAL (OUTPATIENT)
Age: 85
End: 2025-03-05

## 2025-03-10 ENCOUNTER — RX RENEWAL (OUTPATIENT)
Age: 85
End: 2025-03-10

## 2025-04-08 ENCOUNTER — EMERGENCY (EMERGENCY)
Facility: HOSPITAL | Age: 85
LOS: 1 days | End: 2025-04-08
Attending: STUDENT IN AN ORGANIZED HEALTH CARE EDUCATION/TRAINING PROGRAM | Admitting: STUDENT IN AN ORGANIZED HEALTH CARE EDUCATION/TRAINING PROGRAM
Payer: MEDICARE

## 2025-04-08 VITALS
DIASTOLIC BLOOD PRESSURE: 71 MMHG | RESPIRATION RATE: 18 BRPM | HEIGHT: 61 IN | SYSTOLIC BLOOD PRESSURE: 110 MMHG | TEMPERATURE: 97 F | HEART RATE: 80 BPM | WEIGHT: 113.98 LBS | OXYGEN SATURATION: 98 %

## 2025-04-08 VITALS
DIASTOLIC BLOOD PRESSURE: 68 MMHG | OXYGEN SATURATION: 97 % | HEART RATE: 77 BPM | RESPIRATION RATE: 18 BRPM | SYSTOLIC BLOOD PRESSURE: 115 MMHG | TEMPERATURE: 98 F

## 2025-04-08 DIAGNOSIS — Z98.890 OTHER SPECIFIED POSTPROCEDURAL STATES: Chronic | ICD-10-CM

## 2025-04-08 DIAGNOSIS — Z90.89 ACQUIRED ABSENCE OF OTHER ORGANS: Chronic | ICD-10-CM

## 2025-04-08 DIAGNOSIS — Z90.49 ACQUIRED ABSENCE OF OTHER SPECIFIED PARTS OF DIGESTIVE TRACT: Chronic | ICD-10-CM

## 2025-04-08 LAB
ALBUMIN SERPL ELPH-MCNC: 3.2 G/DL — LOW (ref 3.3–5)
ALP SERPL-CCNC: 84 U/L — SIGNIFICANT CHANGE UP (ref 40–120)
ALT FLD-CCNC: 45 U/L — SIGNIFICANT CHANGE UP (ref 12–78)
ANION GAP SERPL CALC-SCNC: 3 MMOL/L — LOW (ref 5–17)
AST SERPL-CCNC: 22 U/L — SIGNIFICANT CHANGE UP (ref 15–37)
BASOPHILS # BLD AUTO: 0.01 K/UL — SIGNIFICANT CHANGE UP (ref 0–0.2)
BASOPHILS NFR BLD AUTO: 0.1 % — SIGNIFICANT CHANGE UP (ref 0–2)
BILIRUB SERPL-MCNC: 1.4 MG/DL — HIGH (ref 0.2–1.2)
BUN SERPL-MCNC: 16 MG/DL — SIGNIFICANT CHANGE UP (ref 7–23)
CALCIUM SERPL-MCNC: 9.1 MG/DL — SIGNIFICANT CHANGE UP (ref 8.5–10.1)
CHLORIDE SERPL-SCNC: 109 MMOL/L — HIGH (ref 96–108)
CO2 SERPL-SCNC: 28 MMOL/L — SIGNIFICANT CHANGE UP (ref 22–31)
CREAT SERPL-MCNC: 0.72 MG/DL — SIGNIFICANT CHANGE UP (ref 0.5–1.3)
EGFR: 82 ML/MIN/1.73M2 — SIGNIFICANT CHANGE UP
EGFR: 82 ML/MIN/1.73M2 — SIGNIFICANT CHANGE UP
EOSINOPHIL # BLD AUTO: 0.08 K/UL — SIGNIFICANT CHANGE UP (ref 0–0.5)
EOSINOPHIL NFR BLD AUTO: 0.8 % — SIGNIFICANT CHANGE UP (ref 0–6)
FLUAV AG NPH QL: SIGNIFICANT CHANGE UP
FLUBV AG NPH QL: SIGNIFICANT CHANGE UP
GLUCOSE SERPL-MCNC: 94 MG/DL — SIGNIFICANT CHANGE UP (ref 70–99)
HCT VFR BLD CALC: 40.9 % — SIGNIFICANT CHANGE UP (ref 34.5–45)
HGB BLD-MCNC: 13.6 G/DL — SIGNIFICANT CHANGE UP (ref 11.5–15.5)
IMM GRANULOCYTES NFR BLD AUTO: 0.5 % — SIGNIFICANT CHANGE UP (ref 0–0.9)
LYMPHOCYTES # BLD AUTO: 1.89 K/UL — SIGNIFICANT CHANGE UP (ref 1–3.3)
LYMPHOCYTES # BLD AUTO: 20 % — SIGNIFICANT CHANGE UP (ref 13–44)
MCHC RBC-ENTMCNC: 30.5 PG — SIGNIFICANT CHANGE UP (ref 27–34)
MCHC RBC-ENTMCNC: 33.3 G/DL — SIGNIFICANT CHANGE UP (ref 32–36)
MCV RBC AUTO: 91.7 FL — SIGNIFICANT CHANGE UP (ref 80–100)
MONOCYTES # BLD AUTO: 0.79 K/UL — SIGNIFICANT CHANGE UP (ref 0–0.9)
MONOCYTES NFR BLD AUTO: 8.4 % — SIGNIFICANT CHANGE UP (ref 2–14)
NEUTROPHILS # BLD AUTO: 6.64 K/UL — SIGNIFICANT CHANGE UP (ref 1.8–7.4)
NEUTROPHILS NFR BLD AUTO: 70.2 % — SIGNIFICANT CHANGE UP (ref 43–77)
NRBC BLD AUTO-RTO: 0 /100 WBCS — SIGNIFICANT CHANGE UP (ref 0–0)
PLATELET # BLD AUTO: 204 K/UL — SIGNIFICANT CHANGE UP (ref 150–400)
POTASSIUM SERPL-MCNC: 4 MMOL/L — SIGNIFICANT CHANGE UP (ref 3.5–5.3)
POTASSIUM SERPL-SCNC: 4 MMOL/L — SIGNIFICANT CHANGE UP (ref 3.5–5.3)
PROT SERPL-MCNC: 6.3 G/DL — SIGNIFICANT CHANGE UP (ref 6–8.3)
RBC # BLD: 4.46 M/UL — SIGNIFICANT CHANGE UP (ref 3.8–5.2)
RBC # FLD: 12.8 % — SIGNIFICANT CHANGE UP (ref 10.3–14.5)
RSV RNA NPH QL NAA+NON-PROBE: SIGNIFICANT CHANGE UP
SARS-COV-2 RNA SPEC QL NAA+PROBE: DETECTED
SODIUM SERPL-SCNC: 140 MMOL/L — SIGNIFICANT CHANGE UP (ref 135–145)
SOURCE RESPIRATORY: SIGNIFICANT CHANGE UP
WBC # BLD: 9.46 K/UL — SIGNIFICANT CHANGE UP (ref 3.8–10.5)
WBC # FLD AUTO: 9.46 K/UL — SIGNIFICANT CHANGE UP (ref 3.8–10.5)

## 2025-04-08 PROCEDURE — 36415 COLL VENOUS BLD VENIPUNCTURE: CPT

## 2025-04-08 PROCEDURE — 85025 COMPLETE CBC W/AUTO DIFF WBC: CPT

## 2025-04-08 PROCEDURE — 71045 X-RAY EXAM CHEST 1 VIEW: CPT | Mod: 26

## 2025-04-08 PROCEDURE — 99283 EMERGENCY DEPT VISIT LOW MDM: CPT | Mod: 25

## 2025-04-08 PROCEDURE — 80053 COMPREHEN METABOLIC PANEL: CPT

## 2025-04-08 PROCEDURE — 99284 EMERGENCY DEPT VISIT MOD MDM: CPT

## 2025-04-08 PROCEDURE — 71045 X-RAY EXAM CHEST 1 VIEW: CPT

## 2025-04-08 PROCEDURE — 87637 SARSCOV2&INF A&B&RSV AMP PRB: CPT

## 2025-04-08 NOTE — ED ADULT NURSE NOTE - NSFALLHARMRISKINTERV_ED_ALL_ED

## 2025-04-08 NOTE — ED PROVIDER NOTE - ATTENDING APP SHARED VISIT CONTRIBUTION OF CARE
Ciaran ATTG See MDM I performed a history and physical exam of the patient and discussed their management with the Physician assistant reviewed the PAs note and agree with the documented findings and plan of care. My medical decision making and observations are found above.

## 2025-04-08 NOTE — ED ADULT TRIAGE NOTE - CHIEF COMPLAINT QUOTE
A&Ox4 to ED with complaints of cough, congestion, body aches x few days worse since yesterday - took tylenol this morning. afebrile

## 2025-04-08 NOTE — ED PROVIDER NOTE - WR ORDER NAME 1
Airway  Urgency: elective    Date/Time: 10/15/2020 10:21 AM  Airway not difficult    General Information and Staff    Patient location during procedure: OR  Anesthesiologist: Flo Lopez MD  CRNA: Rosa Aguilar CRNA    Indications and Patient Condition  Indications for airway management: airway protection    Preoxygenated: yes  MILS maintained throughout  Mask difficulty assessment: 1 - vent by mask    Final Airway Details  Final airway type: endotracheal airway      Successful airway: ETT  Cuffed: yes   Successful intubation technique: video laryngoscopy  Facilitating devices/methods: anterior pressure/BURP and intubating stylet  Endotracheal tube insertion site: oral  Blade: CMAC  Blade size: D  ETT size (mm): 8.0  Cormack-Lehane Classification: grade IIa - partial view of glottis  Placement verified by: chest auscultation and capnometry   Cuff volume (mL): 8  Measured from: lips  ETT/EBT  to lips (cm): 24  Number of attempts at approach: 1  Assessment: lips, teeth, and gum same as pre-op and atraumatic intubation    Additional Comments  Pt preoxygenated, SIVI, bag mask vent, ATETI, dentition as before            
Xray Chest 1 View AP/PA

## 2025-04-08 NOTE — ED PROVIDER NOTE - NSFOLLOWUPINSTRUCTIONS_ED_ALL_ED_FT
WHAT YOU NEED TO KNOW:    What do I need to know about COVID-19? COVID-19 is the disease caused by a coronavirus first discovered in December 2019. The virus has changed into several new forms (called variants) since it was discovered. A variant may be more easily spread or cause more severe illness than the original form.    What are the signs and symptoms of COVID-19? Signs and symptoms usually start about 5 days after infection but can take 2 to 14 days. You may feel like you have the flu or a bad cold. Some signs and symptoms go away in a few days. Others can last weeks, months, or possibly years. You may have any of the following:    A cough or sore throat    Shortness of breath or trouble breathing that may become severe    A fever, possibly with chills and shaking    Muscle pain, body aches, or a headache    Sudden changes in or loss of your taste or smell    Feeling mentally and physically tired (fatigue)    Congestion (stuffy head and nose), or a runny nose    Diarrhea, nausea, or vomiting  How is COVID-19 diagnosed? Your healthcare provider will examine you and ask about your symptoms. Tell your provider if you have any health conditions or are taking any medicines. Your provider can help you create a COVID-19 plan if you are at high risk for severe illness. Some tests are available for you to do at home. Most use a nasal swab and give the result within 15 minutes. Testing sites may also be available. Test immediately if you have symptoms. Wait 5 days after exposure if you do not have symptoms. Any of the following tests may be used:    A PCR test shows if you have a current infection. A sample is taken from your nose or throat with a swab. You may need to quarantine until you get the results from a testing site.    An antigen test shows if you have a protein from the COVID-19 virus. This test is often called a rapid test because the results are available in 30 minutes or less. Without symptoms, you need 3 negative antigen results to be certain you do not have COVID-19. With symptoms, you need 2 negative results. Antigen tests should be taken 48 hours apart. A negative PCR result is considered confirmation of the first negative antigen result.    An antibody test shows if you had a recent or past infection. Blood samples are used for this test. Antibodies are made by your immune system to fight the virus that causes COVID-19. Antibodies form 1 to 3 weeks after you are infected. This test is not used to show if you are immune to the virus.    A CT, MRI, ultrasound, or x-ray may show complications of COVID-19, such as pneumonia or blood clots.  How is COVID-19 treated?    Mild symptoms may get better on their own. Some treatments have emergency use authorization (EUA). Examples include monoclonal antibodies and convalescent plasma. These may be given to help prevent worsening of your symptoms. You may also need any of the following:  Decongestants help reduce nasal congestion and help you breathe more easily. If you take decongestant pills, they may make you feel restless or cause problems with your sleep. Do not use decongestant sprays for more than a few days.    Cough suppressants help reduce coughing. Ask your healthcare provider which type of cough medicine is best for you.    To soothe a sore throat, gargle with warm salt water, or use throat lozenges or a throat spray. Drink more liquids to thin and loosen mucus and to prevent dehydration.    NSAIDs or acetaminophen can help lower a fever and relieve body aches or a headache. Follow directions. If not taken correctly, NSAIDs can cause kidney damage and acetaminophen can cause liver damage.    Antiviral medicines may be given if you are at risk for developing severe or life-threatening symptoms.    Severe or life-threatening symptoms are treated in the hospital. You may need any of the following:  Medicines may be given to fight the virus or treat inflammation.    Blood thinners help prevent or treat blood clots. If you have a deep vein thrombosis (DVT) or pulmonary embolism (PE), you may need to use blood thinners for at least 3 months.    Extra oxygen may be given if you have respiratory failure. This means your lungs cannot get enough oxygen into your blood and out to your organs.    A ventilator may be used to help you breathe.  What do I need to know about long COVID? Long COVID is a term to describe health problems the virus may cause.    Certain conditions increase your risk for long COVID. Your risk is higher if you are 65 or older or a current or former smoker. A weak immune system, obesity, diabetes, or kidney, heart, or lung disease can also increase your risk.    Long COVID may cause severe or chronic health problems. The most common problem is trouble thinking, remembering, or concentrating. You may also develop shortness of breath or a serious respiratory condition such as pneumonia. Blood clots may form and lead to organ damage. You may have nerve pain, trouble sleeping, or mood changes.    COVID-19 can lead to severe inflammation in your organs. This is also called multisymptom inflammatory syndrome in adults (MIS-A) or MIS-C in children. Inflammation may affect the heart, digestive system, skin, or brain.  How does the virus spread? The virus can be spread starting 2 to 3 days before symptoms begin. Close personal contact with an infected person increases your risk for infection. This means being within 6 feet (2 meters) of the person for at least 15 minutes over 24 hours.    The virus travels in droplets that form when a person talks, sings, coughs, or sneezes. The droplets can also float in the air for minutes or hours. Infection happens when you breathe in the droplets or get them in your eyes or nose.    Person-to-person contact can spread the virus. For example, a person with the virus on his or her hands can spread it by shaking hands with someone.    The virus can stay on objects and surfaces for hours to days. You may become infected by touching the object or surface and then touching your eyes or mouth.  What do I need to know about COVID-19 vaccines? Healthcare providers recommend vaccination, even if you already had COVID-19.    Get a COVID-19 vaccine as directed. At least 1 dose of an updated vaccine is recommended for everyone 6 months or older. COVID-19 vaccines are given as a shot in 1 to 3 doses, depending on the age of the person who receives it. COVID-19 vaccines are updated throughout the year. Your healthcare provider can help you schedule all needed doses as updated vaccines become available.  Recommended COVID-19 Immunization Schedule      Continue to protect yourself and others. You can become infected even after you get the vaccine. You may also be able to pass the virus to others without knowing you are infected.    After you get the vaccine, check local, national, and international travel rules. You may need to be tested before you travel. Some countries require proof of a negative test before you travel. You may also need to quarantine after you return.    Medicine may be given to prevent infection. The medicine can be given if you are at high risk for infection and cannot get the vaccine. It can also be given if your immune system does not respond well to the vaccine.  How can I help lower my risk for COVID-19 during an active outbreak?    Stay home if you are sick or think you may have COVID-19. It is important to stay home if you are waiting for a testing appointment or for test results.    Wash your hands often throughout the day. Use soap and water. Rub your soapy hands together, lacing your fingers, for at least 20 seconds. Rinse with warm, running water. Dry your hands with a clean towel or paper towel. Use hand  that contains alcohol if soap and water are not available. Teach children how to wash their hands and use hand .  Handwashing      Cover sneezes and coughs. Turn your face away and cover your mouth and nose with a tissue. Throw the tissue away. Use the bend of your arm if a tissue is not available. Then wash your hands well with soap and water or use hand . Teach children how to cover a cough or sneeze.    Wear a face covering (mask) when needed. Use a cloth covering with at least 2 layers. You can also create layers by putting a cloth covering over a disposable non-medical mask. Cover your mouth and your nose.  How to Wear a Face Covering (Mask)      Try to keep space between you and others when you are out of the house. Avoid crowds as much as possible. Wear a face covering when you must be around a large group and cannot keep space between you and others.    Clean and disinfect high-touch surfaces and objects often. Use disinfecting wipes, or make a solution of 4 teaspoons of bleach in 1 quart (4 cups) of water.    Ask about other vaccines you may need. Get the influenza (flu) vaccine as soon as recommended each year, usually starting in September or October. Get the pneumonia and respiratory syncytial virus (RSV) vaccines, if recommended. Your healthcare provider can tell you if you should also get other vaccines, and when to get them.  Prevent COVID-19 Infection  Where can I find more information?    Centers for Disease Control and Prevention  [address] [place] [address]  Phone: 1-352.288.6546  Web Address: http://www.cdc.gov  Call your local emergency number (911 in the [place]) if:    You have trouble breathing or shortness of breath at rest.    You have chest pain or pressure that lasts longer than 5 minutes.    You become confused or hard to wake.  When should I seek immediate care?    You have trouble staying awake during the day.    Your nail beds, face, fingers, or toes look blue or darker than usual.  When should I call my doctor?    You have a fever.    You have questions or concerns about your condition or care.  CARE AGREEMENT:    You have the right to help plan your care. Learn about your health condition and how it may be treated. Discuss treatment options with your healthcare providers to decide what care you want to receive. You always have the right to refuse treatment.

## 2025-04-08 NOTE — ED PROVIDER NOTE - PATIENT PORTAL LINK FT
You can access the FollowMyHealth Patient Portal offered by [facility] by registering at the following website: [email or web address]. By joining [employer]’s FollowMyHealth portal, you will also be able to view your health information using other applications (apps) compatible with our system.

## 2025-04-08 NOTE — ED ADULT NURSE NOTE - OBJECTIVE STATEMENT
pt is AOX4, pt is from home, came to the ED with c/o cough, SOB, wheezing worsening today. pt states symptoms have been present  for over 1 week. Pt did arrive from [place] a few a days ago when symptoms started there. pt arrived here with c/o cough and some wheezing. pt states she was given an PO ABT in [place] for bronchitis, unsure which ABT but finished course. Pt denies ABD pain, n/v/dizziness. Denies lethargy. Denies change in appetite. Denies CP/CAMPUZANO. hx Afib, RA, HTN. pt is able to ambulate with steady gait. pts  states  algae build up in the pool , flu-like symptoms started the day after noticing pool was not cleaned. pulse ox 100% room air, HR 72. care ongoing. call bell placed within reach.

## 2025-04-08 NOTE — ED PROVIDER NOTE - CLINICAL SUMMARY MEDICAL DECISION MAKING FREE TEXT BOX
Ciaran PURCELLG    Patient 85-year-old female past medical for seizures on lamotrigine A-fib on Eliquis hypertension hyperlipidemia coming in for cold-like symptoms for the past 3 days while she was in [place].  Patient states she returned recently tried to make an appointment with her PCP but are unavailable today so came to emergency room.  Patient having low-grade temperatures for last 3 days denies any shortness of breath nausea vomiting diarrhea abdominal pain urinary symptoms.  Patient states she is on Augmentin which was prescribed while she was in [place].    pt made aware of covid dx otherwise of NOT hypoxic no tachypnea, plan for dc

## 2025-04-08 NOTE — ED PROVIDER NOTE - OBJECTIVE STATEMENT
Patient 85-year-old female past medical for seizures on lamotrigine A-fib on Eliquis hypertension hyperlipidemia coming in for cold-like symptoms for the past 3 days while she was in [place].  Patient states she returned recently tried to make an appointment with her PCP but are unavailable today so came to emergency room.  Patient having low-grade temperatures for last 3 days denies any shortness of breath nausea vomiting diarrhea abdominal pain urinary symptoms.  Patient states she is on Augmentin which was prescribed while she was in [place].

## 2025-04-18 ENCOUNTER — APPOINTMENT (OUTPATIENT)
Dept: INTERNAL MEDICINE | Facility: CLINIC | Age: 85
End: 2025-04-18
Payer: MEDICARE

## 2025-04-18 VITALS
OXYGEN SATURATION: 98 % | DIASTOLIC BLOOD PRESSURE: 72 MMHG | TEMPERATURE: 98.3 F | HEIGHT: 62 IN | SYSTOLIC BLOOD PRESSURE: 120 MMHG | HEART RATE: 78 BPM | BODY MASS INDEX: 20.24 KG/M2 | WEIGHT: 110 LBS | RESPIRATION RATE: 12 BRPM

## 2025-04-18 DIAGNOSIS — E78.5 HYPERLIPIDEMIA, UNSPECIFIED: ICD-10-CM

## 2025-04-18 DIAGNOSIS — I48.0 PAROXYSMAL ATRIAL FIBRILLATION: ICD-10-CM

## 2025-04-18 PROCEDURE — G2211 COMPLEX E/M VISIT ADD ON: CPT

## 2025-04-18 PROCEDURE — 99214 OFFICE O/P EST MOD 30 MIN: CPT

## 2025-04-19 DIAGNOSIS — I10 ESSENTIAL (PRIMARY) HYPERTENSION: ICD-10-CM

## 2025-04-19 LAB
ALBUMIN SERPL ELPH-MCNC: 4.1 G/DL
ALP BLD-CCNC: 91 U/L
ALT SERPL-CCNC: 31 U/L
ANION GAP SERPL CALC-SCNC: 11 MMOL/L
AST SERPL-CCNC: 21 U/L
BASOPHILS # BLD AUTO: 0.03 K/UL
BASOPHILS NFR BLD AUTO: 0.2 %
BILIRUB SERPL-MCNC: 0.9 MG/DL
BUN SERPL-MCNC: 16 MG/DL
CALCIUM SERPL-MCNC: 10.3 MG/DL
CHLORIDE SERPL-SCNC: 103 MMOL/L
CHOLEST SERPL-MCNC: 142 MG/DL
CO2 SERPL-SCNC: 27 MMOL/L
CREAT SERPL-MCNC: 0.74 MG/DL
EGFRCR SERPLBLD CKD-EPI 2021: 79 ML/MIN/1.73M2
EOSINOPHIL # BLD AUTO: 0.09 K/UL
EOSINOPHIL NFR BLD AUTO: 0.7 %
ESTIMATED AVERAGE GLUCOSE: 128 MG/DL
GLUCOSE SERPL-MCNC: 97 MG/DL
HBA1C MFR BLD HPLC: 6.1 %
HCT VFR BLD CALC: 44.9 %
HDLC SERPL-MCNC: 67 MG/DL
HGB BLD-MCNC: 14.1 G/DL
IMM GRANULOCYTES NFR BLD AUTO: 0.5 %
LDLC SERPL-MCNC: 65 MG/DL
LYMPHOCYTES # BLD AUTO: 2.18 K/UL
LYMPHOCYTES NFR BLD AUTO: 16.8 %
MAN DIFF?: NORMAL
MCHC RBC-ENTMCNC: 30.5 PG
MCHC RBC-ENTMCNC: 31.4 G/DL
MCV RBC AUTO: 97 FL
MONOCYTES # BLD AUTO: 1.17 K/UL
MONOCYTES NFR BLD AUTO: 9 %
NEUTROPHILS # BLD AUTO: 9.46 K/UL
NEUTROPHILS NFR BLD AUTO: 72.8 %
NONHDLC SERPL-MCNC: 75 MG/DL
PLATELET # BLD AUTO: 274 K/UL
POTASSIUM SERPL-SCNC: 4.5 MMOL/L
PROT SERPL-MCNC: 6.4 G/DL
RBC # BLD: 4.63 M/UL
RBC # FLD: 14 %
SODIUM SERPL-SCNC: 140 MMOL/L
TRIGL SERPL-MCNC: 43 MG/DL
TSH SERPL-ACNC: 1.04 UIU/ML
WBC # FLD AUTO: 12.99 K/UL

## 2025-05-05 ENCOUNTER — NON-APPOINTMENT (OUTPATIENT)
Age: 85
End: 2025-05-05

## 2025-05-06 ENCOUNTER — NON-APPOINTMENT (OUTPATIENT)
Age: 85
End: 2025-05-06

## 2025-05-08 ENCOUNTER — APPOINTMENT (OUTPATIENT)
Dept: INTERNAL MEDICINE | Facility: CLINIC | Age: 85
End: 2025-05-08
Payer: MEDICARE

## 2025-05-08 VITALS
TEMPERATURE: 97.8 F | SYSTOLIC BLOOD PRESSURE: 126 MMHG | RESPIRATION RATE: 14 BRPM | DIASTOLIC BLOOD PRESSURE: 74 MMHG | HEART RATE: 94 BPM | HEIGHT: 62 IN | OXYGEN SATURATION: 98 %

## 2025-05-08 DIAGNOSIS — M47.816 SPONDYLOSIS W/OUT MYELOPATHY OR RADICULOPATHY, LUMBAR REGION: ICD-10-CM

## 2025-05-08 DIAGNOSIS — R05.9 COUGH, UNSPECIFIED: ICD-10-CM

## 2025-05-08 DIAGNOSIS — E78.5 HYPERLIPIDEMIA, UNSPECIFIED: ICD-10-CM

## 2025-05-08 DIAGNOSIS — J06.9 ACUTE UPPER RESPIRATORY INFECTION, UNSPECIFIED: ICD-10-CM

## 2025-05-08 DIAGNOSIS — I10 ESSENTIAL (PRIMARY) HYPERTENSION: ICD-10-CM

## 2025-05-08 PROCEDURE — 99214 OFFICE O/P EST MOD 30 MIN: CPT

## 2025-05-08 PROCEDURE — G2211 COMPLEX E/M VISIT ADD ON: CPT

## 2025-05-08 RX ORDER — BENZONATATE 100 MG/1
100 CAPSULE ORAL 3 TIMES DAILY
Qty: 20 | Refills: 0 | Status: ACTIVE | COMMUNITY
Start: 2025-05-08 | End: 1900-01-01

## 2025-05-13 ENCOUNTER — APPOINTMENT (OUTPATIENT)
Dept: CARDIOLOGY | Facility: CLINIC | Age: 85
End: 2025-05-13
Payer: MEDICARE

## 2025-05-13 VITALS
BODY MASS INDEX: 20.43 KG/M2 | DIASTOLIC BLOOD PRESSURE: 70 MMHG | WEIGHT: 111 LBS | HEIGHT: 62 IN | HEART RATE: 86 BPM | SYSTOLIC BLOOD PRESSURE: 156 MMHG | OXYGEN SATURATION: 99 %

## 2025-05-13 DIAGNOSIS — I48.0 PAROXYSMAL ATRIAL FIBRILLATION: ICD-10-CM

## 2025-05-13 DIAGNOSIS — I48.19 OTHER PERSISTENT ATRIAL FIBRILLATION: ICD-10-CM

## 2025-05-13 PROCEDURE — 99215 OFFICE O/P EST HI 40 MIN: CPT

## 2025-05-13 PROCEDURE — G2211 COMPLEX E/M VISIT ADD ON: CPT

## 2025-05-13 PROCEDURE — 93000 ELECTROCARDIOGRAM COMPLETE: CPT

## 2025-05-13 RX ORDER — ROMOSOZUMAB-AQQG 105 MG/1.17ML
105 INJECTION, SOLUTION SUBCUTANEOUS
Qty: 2 | Refills: 0 | Status: ACTIVE | COMMUNITY
Start: 2025-04-08

## 2025-06-23 ENCOUNTER — APPOINTMENT (OUTPATIENT)
Dept: ORTHOPEDIC SURGERY | Facility: CLINIC | Age: 85
End: 2025-06-23

## 2025-06-23 ENCOUNTER — RESULT CHARGE (OUTPATIENT)
Age: 85
End: 2025-06-23

## 2025-06-23 VITALS — HEIGHT: 62 IN | WEIGHT: 111 LBS | BODY MASS INDEX: 20.43 KG/M2

## 2025-06-23 PROBLEM — M54.6 THORACIC SPINE PAIN: Status: ACTIVE | Noted: 2025-06-23

## 2025-06-23 PROBLEM — M51.34 OTHER INTERVERTEBRAL DISC DEGENERATION, THORACIC REGION: Status: ACTIVE | Noted: 2025-06-23

## 2025-06-23 PROBLEM — M54.2 CERVICALGIA: Status: ACTIVE | Noted: 2025-06-23

## 2025-06-23 PROCEDURE — 99214 OFFICE O/P EST MOD 30 MIN: CPT

## 2025-06-23 RX ORDER — BACLOFEN 5 MG/1
5 TABLET ORAL AT BEDTIME
Qty: 15 | Refills: 0 | Status: ACTIVE | COMMUNITY
Start: 2025-06-23 | End: 1900-01-01

## 2025-06-25 ENCOUNTER — APPOINTMENT (OUTPATIENT)
Dept: MRI IMAGING | Facility: CLINIC | Age: 85
End: 2025-06-25

## 2025-06-25 PROCEDURE — 72146 MRI CHEST SPINE W/O DYE: CPT

## 2025-06-25 PROCEDURE — 72141 MRI NECK SPINE W/O DYE: CPT

## 2025-07-03 ENCOUNTER — APPOINTMENT (OUTPATIENT)
Dept: ORTHOPEDIC SURGERY | Facility: CLINIC | Age: 85
End: 2025-07-03
Payer: MEDICARE

## 2025-07-03 PROCEDURE — 99213 OFFICE O/P EST LOW 20 MIN: CPT

## 2025-07-10 ENCOUNTER — APPOINTMENT (OUTPATIENT)
Dept: PAIN MANAGEMENT | Facility: CLINIC | Age: 85
End: 2025-07-10

## 2025-07-10 VITALS — HEIGHT: 62 IN | WEIGHT: 111 LBS | BODY MASS INDEX: 20.43 KG/M2

## 2025-07-10 PROCEDURE — 99213 OFFICE O/P EST LOW 20 MIN: CPT

## 2025-07-14 ENCOUNTER — NON-APPOINTMENT (OUTPATIENT)
Age: 85
End: 2025-07-14

## 2025-07-24 ENCOUNTER — APPOINTMENT (OUTPATIENT)
Dept: PAIN MANAGEMENT | Facility: CLINIC | Age: 85
End: 2025-07-24
Payer: MEDICARE

## 2025-07-24 PROCEDURE — 64490 INJ PARAVERT F JNT C/T 1 LEV: CPT | Mod: LT

## 2025-07-24 PROCEDURE — 64491 INJ PARAVERT F JNT C/T 2 LEV: CPT | Mod: LT,59

## 2025-07-24 PROCEDURE — J3490M: CUSTOM

## 2025-08-07 ENCOUNTER — APPOINTMENT (OUTPATIENT)
Dept: PAIN MANAGEMENT | Facility: CLINIC | Age: 85
End: 2025-08-07
Payer: MEDICARE

## 2025-08-07 DIAGNOSIS — M47.812 SPONDYLOSIS W/OUT MYELOPATHY OR RADICULOPATHY, CERVICAL REGION: ICD-10-CM

## 2025-08-07 PROCEDURE — J3490M: CUSTOM

## 2025-08-07 PROCEDURE — 64490 INJ PARAVERT F JNT C/T 1 LEV: CPT | Mod: RT

## 2025-08-07 PROCEDURE — 64491 INJ PARAVERT F JNT C/T 2 LEV: CPT | Mod: RT,59

## 2025-08-14 ENCOUNTER — APPOINTMENT (OUTPATIENT)
Dept: PAIN MANAGEMENT | Facility: CLINIC | Age: 85
End: 2025-08-14
Payer: MEDICARE

## 2025-08-14 VITALS — WEIGHT: 114 LBS | BODY MASS INDEX: 20.98 KG/M2 | HEIGHT: 62 IN

## 2025-08-14 DIAGNOSIS — M50.90 CERVICAL DISC DISORDER, UNSPECIFIED, UNSPECIFIED CERVICAL REGION: ICD-10-CM

## 2025-08-14 DIAGNOSIS — M54.2 CERVICALGIA: ICD-10-CM

## 2025-08-14 PROCEDURE — 99213 OFFICE O/P EST LOW 20 MIN: CPT

## 2025-08-15 ENCOUNTER — RX RENEWAL (OUTPATIENT)
Age: 85
End: 2025-08-15

## 2025-08-15 ENCOUNTER — APPOINTMENT (OUTPATIENT)
Dept: MRI IMAGING | Facility: CLINIC | Age: 85
End: 2025-08-15
Payer: MEDICARE

## 2025-08-15 PROCEDURE — 72148 MRI LUMBAR SPINE W/O DYE: CPT

## 2025-09-08 ENCOUNTER — APPOINTMENT (OUTPATIENT)
Dept: ORTHOPEDIC SURGERY | Facility: CLINIC | Age: 85
End: 2025-09-08

## 2025-09-18 ENCOUNTER — APPOINTMENT (OUTPATIENT)
Dept: PAIN MANAGEMENT | Facility: CLINIC | Age: 85
End: 2025-09-18
Payer: MEDICARE

## 2025-09-18 DIAGNOSIS — M47.812 SPONDYLOSIS W/OUT MYELOPATHY OR RADICULOPATHY, CERVICAL REGION: ICD-10-CM

## 2025-09-18 PROCEDURE — 64491 INJ PARAVERT F JNT C/T 2 LEV: CPT | Mod: RT,59

## 2025-09-18 PROCEDURE — J3490M: CUSTOM

## 2025-09-18 PROCEDURE — 64490 INJ PARAVERT F JNT C/T 1 LEV: CPT | Mod: RT

## 2025-09-19 ENCOUNTER — RX RENEWAL (OUTPATIENT)
Age: 85
End: 2025-09-19

## 2025-09-20 ENCOUNTER — RX RENEWAL (OUTPATIENT)
Age: 85
End: 2025-09-20

## (undated) DEVICE — GLV 8.5 PROTEXIS (WHITE)

## (undated) DEVICE — STYLET  ENDOTRACH 7.5MM X 10MM

## (undated) DEVICE — TRAY EPIDURAL SINGLE DOSE

## (undated) DEVICE — NDL SPINAL 22G X 3.5" QUINCKE